# Patient Record
Sex: FEMALE | Race: OTHER | NOT HISPANIC OR LATINO | Employment: UNEMPLOYED | URBAN - METROPOLITAN AREA
[De-identification: names, ages, dates, MRNs, and addresses within clinical notes are randomized per-mention and may not be internally consistent; named-entity substitution may affect disease eponyms.]

---

## 2024-05-17 ENCOUNTER — TELEPHONE (OUTPATIENT)
Dept: OBSTETRICS AND GYNECOLOGY | Facility: CLINIC | Age: 24
End: 2024-05-17
Payer: MEDICAID

## 2024-05-17 NOTE — TELEPHONE ENCOUNTER
Spoke with patient for a total of 30 minutes.  Updated chart to reflect up to date patient demographics.  Medication, pharmacy, and family history updated.  Patient was guided through expectations of OB/GYN care throughout history of pregnancy.  Pregnancy confirmation, dating u/s initial OB  scheduled for the pregnancy.    Language Line :     ID : Susy   name : [id number]    Reached out to patient via language line , unable to reach patient , left voice mail via  .

## 2024-05-21 ENCOUNTER — CLINICAL SUPPORT (OUTPATIENT)
Dept: OBSTETRICS AND GYNECOLOGY | Facility: CLINIC | Age: 24
End: 2024-05-21
Payer: MEDICAID

## 2024-05-21 DIAGNOSIS — N91.2 AMENORRHEA: Primary | ICD-10-CM

## 2024-05-21 DIAGNOSIS — Z36.89 ENCOUNTER FOR FETAL ANATOMIC SURVEY: Primary | ICD-10-CM

## 2024-05-21 PROCEDURE — 99211 OFF/OP EST MAY X REQ PHY/QHP: CPT | Mod: PBBFAC

## 2024-05-21 PROCEDURE — 99999 PR PBB SHADOW E&M-EST. PATIENT-LVL I: CPT | Mod: PBBFAC,,,

## 2024-05-21 NOTE — PROGRESS NOTES
Narendra -     Name : Vee  Id# [id number]  Spoke with patient for a total of 30 minutes.  Updated chart to reflect up to date patient demographics.  Medication, pharmacy, and family history updated.  Patient was guided through expectations of OB/GYN care throughout history of pregnancy.  Pregnancy confirmation, dating u/s initial OB  scheduled for the pregnancy.

## 2024-05-23 ENCOUNTER — PROCEDURE VISIT (OUTPATIENT)
Dept: MATERNAL FETAL MEDICINE | Facility: CLINIC | Age: 24
End: 2024-05-23
Attending: OBSTETRICS & GYNECOLOGY
Payer: MEDICAID

## 2024-05-23 DIAGNOSIS — Z36.89 ENCOUNTER FOR FETAL ANATOMIC SURVEY: ICD-10-CM

## 2024-05-23 DIAGNOSIS — Z36.89 ENCOUNTER FOR ULTRASOUND TO ASSESS FETAL GROWTH: Primary | ICD-10-CM

## 2024-05-23 PROCEDURE — 76805 OB US >/= 14 WKS SNGL FETUS: CPT | Mod: PBBFAC | Performed by: OBSTETRICS & GYNECOLOGY

## 2024-05-24 PROBLEM — O09.32 LATE PRENATAL CARE AFFECTING PREGNANCY IN SECOND TRIMESTER: Status: ACTIVE | Noted: 2024-05-24

## 2024-05-28 ENCOUNTER — TELEPHONE (OUTPATIENT)
Dept: OBSTETRICS AND GYNECOLOGY | Facility: CLINIC | Age: 24
End: 2024-05-28
Payer: MEDICAID

## 2024-05-28 ENCOUNTER — LAB VISIT (OUTPATIENT)
Dept: LAB | Facility: OTHER | Age: 24
End: 2024-05-28
Attending: OBSTETRICS & GYNECOLOGY
Payer: MEDICAID

## 2024-05-28 ENCOUNTER — OFFICE VISIT (OUTPATIENT)
Dept: OBSTETRICS AND GYNECOLOGY | Facility: CLINIC | Age: 24
End: 2024-05-28
Attending: OBSTETRICS & GYNECOLOGY
Payer: MEDICAID

## 2024-05-28 VITALS
SYSTOLIC BLOOD PRESSURE: 90 MMHG | HEIGHT: 67 IN | DIASTOLIC BLOOD PRESSURE: 60 MMHG | WEIGHT: 149.94 LBS | BODY MASS INDEX: 23.53 KG/M2

## 2024-05-28 DIAGNOSIS — Z34.80 SUPERVISION OF OTHER NORMAL PREGNANCY, ANTEPARTUM: Primary | ICD-10-CM

## 2024-05-28 DIAGNOSIS — Z34.02 ENCOUNTER FOR SUPERVISION OF NORMAL FIRST PREGNANCY IN SECOND TRIMESTER: ICD-10-CM

## 2024-05-28 DIAGNOSIS — Z34.80 SUPERVISION OF OTHER NORMAL PREGNANCY, ANTEPARTUM: ICD-10-CM

## 2024-05-28 DIAGNOSIS — Z12.4 CERVICAL CANCER SCREENING: ICD-10-CM

## 2024-05-28 DIAGNOSIS — O09.32 LATE PRENATAL CARE AFFECTING PREGNANCY IN SECOND TRIMESTER: ICD-10-CM

## 2024-05-28 DIAGNOSIS — Z34.02 ENCOUNTER FOR SUPERVISION OF NORMAL FIRST PREGNANCY, SECOND TRIMESTER: Primary | ICD-10-CM

## 2024-05-28 LAB
ABO + RH BLD: NORMAL
ALBUMIN SERPL BCP-MCNC: 3.2 G/DL (ref 3.5–5.2)
ALP SERPL-CCNC: 81 U/L (ref 55–135)
ALT SERPL W/O P-5'-P-CCNC: 8 U/L (ref 10–44)
AST SERPL-CCNC: 14 U/L (ref 10–40)
BACTERIA #/AREA URNS AUTO: ABNORMAL /HPF
BILIRUB DIRECT SERPL-MCNC: 0.3 MG/DL (ref 0.1–0.3)
BILIRUB SERPL-MCNC: 0.7 MG/DL (ref 0.1–1)
BILIRUB UR QL STRIP: NEGATIVE
BLD GP AB SCN CELLS X3 SERPL QL: NORMAL
CLARITY UR REFRACT.AUTO: ABNORMAL
COLOR UR AUTO: YELLOW
GLUCOSE UR QL STRIP: NEGATIVE
HBV SURFACE AG SERPL QL IA: REACTIVE
HBV SURFACE AG SERPL QL NT: ABNORMAL
HCV AB SERPL QL IA: NEGATIVE
HGB UR QL STRIP: NEGATIVE
HIV 1+2 AB+HIV1 P24 AG SERPL QL IA: NEGATIVE
HYALINE CASTS UR QL AUTO: 0 /LPF
KETONES UR QL STRIP: NEGATIVE
LEUKOCYTE ESTERASE UR QL STRIP: ABNORMAL
MICROSCOPIC COMMENT: ABNORMAL
NITRITE UR QL STRIP: NEGATIVE
PH UR STRIP: 7 [PH] (ref 5–8)
PROT SERPL-MCNC: 7.3 G/DL (ref 6–8.4)
PROT UR QL STRIP: ABNORMAL
RBC #/AREA URNS AUTO: 2 /HPF (ref 0–4)
SP GR UR STRIP: 1.02 (ref 1–1.03)
SPECIMEN OUTDATE: NORMAL
SQUAMOUS #/AREA URNS AUTO: 5 /HPF
TREPONEMA PALLIDUM IGG+IGM AB [PRESENCE] IN SERUM OR PLASMA BY IMMUNOASSAY: NONREACTIVE
URN SPEC COLLECT METH UR: ABNORMAL
WBC #/AREA URNS AUTO: 15 /HPF (ref 0–5)

## 2024-05-28 PROCEDURE — 87491 CHLMYD TRACH DNA AMP PROBE: CPT | Performed by: OBSTETRICS & GYNECOLOGY

## 2024-05-28 PROCEDURE — 86850 RBC ANTIBODY SCREEN: CPT | Performed by: OBSTETRICS & GYNECOLOGY

## 2024-05-28 PROCEDURE — 99213 OFFICE O/P EST LOW 20 MIN: CPT | Mod: PBBFAC,25,TH | Performed by: OBSTETRICS & GYNECOLOGY

## 2024-05-28 PROCEDURE — 87389 HIV-1 AG W/HIV-1&-2 AB AG IA: CPT | Performed by: OBSTETRICS & GYNECOLOGY

## 2024-05-28 PROCEDURE — 86901 BLOOD TYPING SEROLOGIC RH(D): CPT | Performed by: OBSTETRICS & GYNECOLOGY

## 2024-05-28 PROCEDURE — 80076 HEPATIC FUNCTION PANEL: CPT | Performed by: OBSTETRICS & GYNECOLOGY

## 2024-05-28 PROCEDURE — 88175 CYTOPATH C/V AUTO FLUID REDO: CPT | Performed by: OBSTETRICS & GYNECOLOGY

## 2024-05-28 PROCEDURE — 86762 RUBELLA ANTIBODY: CPT | Performed by: OBSTETRICS & GYNECOLOGY

## 2024-05-28 PROCEDURE — 36415 COLL VENOUS BLD VENIPUNCTURE: CPT | Performed by: OBSTETRICS & GYNECOLOGY

## 2024-05-28 PROCEDURE — 86803 HEPATITIS C AB TEST: CPT | Performed by: OBSTETRICS & GYNECOLOGY

## 2024-05-28 PROCEDURE — 87591 N.GONORRHOEAE DNA AMP PROB: CPT | Performed by: OBSTETRICS & GYNECOLOGY

## 2024-05-28 PROCEDURE — 99999 PR PBB SHADOW E&M-EST. PATIENT-LVL III: CPT | Mod: PBBFAC,,, | Performed by: OBSTETRICS & GYNECOLOGY

## 2024-05-28 PROCEDURE — 99203 OFFICE O/P NEW LOW 30 MIN: CPT | Mod: TH,S$PBB,, | Performed by: OBSTETRICS & GYNECOLOGY

## 2024-05-28 PROCEDURE — 81001 URINALYSIS AUTO W/SCOPE: CPT | Performed by: OBSTETRICS & GYNECOLOGY

## 2024-05-28 PROCEDURE — 87086 URINE CULTURE/COLONY COUNT: CPT | Performed by: OBSTETRICS & GYNECOLOGY

## 2024-05-28 PROCEDURE — 86593 SYPHILIS TEST NON-TREP QUANT: CPT | Performed by: OBSTETRICS & GYNECOLOGY

## 2024-05-28 PROCEDURE — 87341 HEP B SURFACE AG NEUTRLZJ IA: CPT | Performed by: OBSTETRICS & GYNECOLOGY

## 2024-05-28 PROCEDURE — 87340 HEPATITIS B SURFACE AG IA: CPT | Performed by: OBSTETRICS & GYNECOLOGY

## 2024-05-28 NOTE — PROGRESS NOTES
SUBJECTIVE:   24 y.o. female   for new pregnancy visit. No LMP recorded (lmp unknown). Patient is pregnant..  She normally has regular periods.  Not using contraception.  She has no unusual complaints. Present with partner and ROBERT .         Past Medical History:   Diagnosis Date    Unspecified viral hepatitis B without hepatic coma      History reviewed. No pertinent surgical history.  Social History     Socioeconomic History    Marital status:    Tobacco Use    Smoking status: Never     Passive exposure: Never    Smokeless tobacco: Never   Substance and Sexual Activity    Alcohol use: Not Currently    Drug use: Not Currently    Sexual activity: Not Currently     No family history on file.  OB History    Para Term  AB Living   3 2 1     2   SAB IAB Ectopic Multiple Live Births           2      # Outcome Date GA Lbr Marshall/2nd Weight Sex Type Anes PTL Lv   3 Current            2 Term 21 40w0d   M Vag-Spont OTHER  RONY   1 Para 19 40w0d   F Vag-Spont  N RONY         Current Outpatient Medications   Medication Sig Dispense Refill    acetaminophen (TYLENOL) 500 MG tablet Take 1 tablet (500 mg total) by mouth every 6 (six) hours as needed. 30 tablet 0     No current facility-administered medications for this visit.     Allergies: Patient has no known allergies.     ROS:  Constitutional: no weight loss, weight gain, fever, fatigue  Eyes:  No vision changes, glasses/contacts  ENT/Mouth: No ulcers, sinus problems, ears ringing, headache  Cardiovascular: No inability to lie flat, chest pain, exercise intolerance, swelling, heart palpitations  Respiratory: No wheezing, coughing blood, shortness of breath, or cough  Gastrointestinal: No diarrhea, bloody stool, nausea/vomiting, constipation, gas, hemorrhoids  Genitourinary: No blood in urine, painful urination, urgency of urination, frequency of urination, incomplete emptying, incontinence, painful periods, heavy periods, vaginal  "discharge, vaginal odor, painful intercourse, sexual problems, bleeding after intercourse.  Musculoskeletal: No muscle weakness  Skin/Breast: No painful breasts, nipple discharge, masses, rash, ulcers  Neurological: No passing out, seizures, numbness, headache  Endocrine: No diabetes, hypothyroid, hyperthyroid, hot flashes, hair loss, abnormal hair growth, ance  Psychiatric: No depression, crying  Hematologic: No bruises, bleeding, swollen lymph nodes, anemia.      OBJECTIVE:   The patient appears well, alert, oriented x 3, in no distress.  BP 90/60 (BP Location: Right arm, Patient Position: Sitting, BP Method: Medium (Manual))   Ht 5' 7" (1.702 m)   Wt 68 kg (149 lb 14.6 oz)   LMP  (LMP Unknown)   BMI 23.48 kg/m²   NECK: no thyromegaly, trachea midline  SKIN: no acne, striae, hirsutism  CHEST: CTAB  CV: RRR  BREAST EXAM: breasts appear normal, no suspicious masses, no skin or nipple changes or axillary nodes  ABDOMEN: Gravid. FHT+  GENITALIA: normal external genitalia, no erythema, no discharge  URETHRA: normal urethra, normal urethral meatus  VAGINA: Normal  CERVIX: no lesions or cervical motion tenderness  UTERUS: Gravid. FCA+  ADNEXA: no mass, fullness, tenderness      ASSESSMENT:   1. Supervision of other normal pregnancy, antepartum  HEPATIC FUNCTION PANEL      2. Late prenatal care affecting pregnancy in second trimester        3. Cervical cancer screening  CANCELED: Liquid-Based Pap Smear, Screening      4. Encounter for supervision of normal first pregnancy in second trimester  Urinalysis    Urine culture    Rubella Antibody, IgG    HIV 1/2 Ag/Ab (4th Gen)    Hepatitis B Surface Antigen    Treponema Pallidium Antibodies IgG, IgM    Hepatitis C Antibody    C. trachomatis/N. gonorrhoeae by AMP DNA    CANCELED: Type & Screen - Ob Profile    CANCELED: OB Glucose Screen          PLAN:   Orders Placed This Encounter    Urine culture    Urinalysis    Rubella Antibody, IgG    HIV 1/2 Ag/Ab (4th Gen)    " Hepatitis B Surface Antigen    Treponema Pallidium Antibodies IgG, IgM    Hepatitis C Antibody    C. trachomatis/N. gonorrhoeae by AMP DNA    HEPATIC FUNCTION PANEL    Urinalysis Microscopic    Pap Smear, Thin Prep with Reflex to HPV     Discussed new OB, ER precautions, PNV, diet, OB labs, u/s  RTC 2-3 weeks

## 2024-05-29 ENCOUNTER — PATIENT MESSAGE (OUTPATIENT)
Dept: OBSTETRICS AND GYNECOLOGY | Facility: CLINIC | Age: 24
End: 2024-05-29
Payer: MEDICAID

## 2024-05-29 LAB
BACTERIA UR CULT: NORMAL
BACTERIA UR CULT: NORMAL
C TRACH DNA SPEC QL NAA+PROBE: NOT DETECTED
N GONORRHOEA DNA SPEC QL NAA+PROBE: NOT DETECTED
RUBV IGG SER-ACNC: 185 IU/ML
RUBV IGG SER-IMP: REACTIVE

## 2024-06-02 PROBLEM — B18.1 HEPATITIS B CARRIER: Status: ACTIVE | Noted: 2024-06-02

## 2024-06-03 ENCOUNTER — TELEPHONE (OUTPATIENT)
Dept: OBSTETRICS AND GYNECOLOGY | Facility: CLINIC | Age: 24
End: 2024-06-03
Payer: MEDICAID

## 2024-06-03 ENCOUNTER — PATIENT MESSAGE (OUTPATIENT)
Dept: MATERNAL FETAL MEDICINE | Facility: CLINIC | Age: 24
End: 2024-06-03
Payer: MEDICAID

## 2024-06-03 DIAGNOSIS — B18.1 HEPATITIS B CARRIER: Primary | ICD-10-CM

## 2024-06-10 ENCOUNTER — OFFICE VISIT (OUTPATIENT)
Dept: MATERNAL FETAL MEDICINE | Facility: CLINIC | Age: 24
End: 2024-06-10
Payer: MEDICAID

## 2024-06-10 ENCOUNTER — LAB VISIT (OUTPATIENT)
Dept: LAB | Facility: OTHER | Age: 24
End: 2024-06-10
Attending: OBSTETRICS & GYNECOLOGY
Payer: MEDICAID

## 2024-06-10 ENCOUNTER — PROCEDURE VISIT (OUTPATIENT)
Dept: MATERNAL FETAL MEDICINE | Facility: CLINIC | Age: 24
End: 2024-06-10
Payer: MEDICAID

## 2024-06-10 DIAGNOSIS — O09.32 LATE PRENATAL CARE AFFECTING PREGNANCY IN SECOND TRIMESTER: ICD-10-CM

## 2024-06-10 DIAGNOSIS — Z36.89 ENCOUNTER FOR ULTRASOUND TO ASSESS FETAL GROWTH: ICD-10-CM

## 2024-06-10 DIAGNOSIS — B18.1 HEPATITIS B CARRIER: Primary | ICD-10-CM

## 2024-06-10 DIAGNOSIS — B18.1 HEPATITIS B CARRIER: ICD-10-CM

## 2024-06-10 LAB
HBV CORE IGM SERPL QL IA: NORMAL
HBV SURFACE AB SER-ACNC: <3 MIU/ML
HBV SURFACE AB SER-ACNC: NORMAL M[IU]/ML
INR PPP: 1 (ref 0.8–1.2)
PROTHROMBIN TIME: 10.9 SEC (ref 9–12.5)

## 2024-06-10 PROCEDURE — 86705 HEP B CORE ANTIBODY IGM: CPT | Performed by: OBSTETRICS & GYNECOLOGY

## 2024-06-10 PROCEDURE — 86706 HEP B SURFACE ANTIBODY: CPT | Mod: 91 | Performed by: OBSTETRICS & GYNECOLOGY

## 2024-06-10 PROCEDURE — 87341 HEP B SURFACE AG NEUTRLZJ IA: CPT | Performed by: OBSTETRICS & GYNECOLOGY

## 2024-06-10 PROCEDURE — 87350 HEPATITIS BE AG IA: CPT | Performed by: OBSTETRICS & GYNECOLOGY

## 2024-06-10 PROCEDURE — 87340 HEPATITIS B SURFACE AG IA: CPT | Performed by: OBSTETRICS & GYNECOLOGY

## 2024-06-10 PROCEDURE — 86704 HEP B CORE ANTIBODY TOTAL: CPT | Performed by: OBSTETRICS & GYNECOLOGY

## 2024-06-10 PROCEDURE — 87517 HEPATITIS B DNA QUANT: CPT | Performed by: OBSTETRICS & GYNECOLOGY

## 2024-06-10 PROCEDURE — 99214 OFFICE O/P EST MOD 30 MIN: CPT | Mod: S$PBB,TH,, | Performed by: OBSTETRICS & GYNECOLOGY

## 2024-06-10 PROCEDURE — 76816 OB US FOLLOW-UP PER FETUS: CPT | Mod: PBBFAC | Performed by: OBSTETRICS & GYNECOLOGY

## 2024-06-10 PROCEDURE — 86706 HEP B SURFACE ANTIBODY: CPT | Performed by: OBSTETRICS & GYNECOLOGY

## 2024-06-10 PROCEDURE — 85610 PROTHROMBIN TIME: CPT | Performed by: OBSTETRICS & GYNECOLOGY

## 2024-06-10 NOTE — PROGRESS NOTES
MATERNAL-FETAL MEDICINE   CONSULT NOTE    Provider requesting consultation: Dr. Rangel    SUBJECTIVE:     Ms. Sheron Frankel is a 24 y.o.  female with IUP at Unknown who is seen in consultation by MFM for evaluation and management of:  Problem   Hepatitis B Carrier   Late Prenatal Care Affecting Pregnancy in Second Trimester            Medication List with Changes/Refills   Current Medications    ACETAMINOPHEN (TYLENOL) 500 MG TABLET    Take 1 tablet (500 mg total) by mouth every 6 (six) hours as needed.       Review of patient's allergies indicates:  No Known Allergies    PMH:  Past Medical History:   Diagnosis Date    Unspecified viral hepatitis B without hepatic coma        PObHx:  OB History    Para Term  AB Living   3 2 1     2   SAB IAB Ectopic Multiple Live Births           2      # Outcome Date GA Lbr Marshall/2nd Weight Sex Type Anes PTL Lv   3 Current            2 Term 21 40w0d   M Vag-Spont OTHER  RONY   1 Para 19 40w0d   F Vag-Spont  N RONY       PSH:No past surgical history on file.    Family history:family history is not on file.    Social history: reports that she has never smoked. She has never been exposed to tobacco smoke. She has never used smokeless tobacco. She reports that she does not currently use alcohol. She reports that she does not currently use drugs.    Genetic history: The patient denies any inherited genetic diseases or birth defects in herself or her partner's personal history or family.    Objective:   LMP  (LMP Unknown)     Physical Exam    Ultrasound performed. See viewpoint for full ultrasound report.    Significant labs/imaging:      ASSESSMENT/PLAN:     24 y.o.  female with IUP at 31+0    Hepatitis B carrier  Labs sent today:  Hep B VL  Hep B C AB + IgM  Hep B e Ag  Hep Bs AB  INR    Full consult after review of above labs.    Late prenatal care affecting pregnancy in second trimester  Patient returns for f/u ultrasound. Dating at last US  5/23 was consistent with ALDEN of 8/12. Today's biometry is consistent with prior dating.    Ultrasound today remains incomplete for conclusive determination of fetal sex. Fetal genitalia in most views appear consistent with female sex; however, some views demonstrate prominence of the labia minora. Prior imaging from 5/23 appeared consistent with female genitalia.    We discussed some preliminary testing options for determination of fetal sex: cfDNA v. Amniocentesis. At this time, they have elected to proceed with cfDNA testing and f/u imaging in two weeks with MFM consult and possibly genetic counseling visit at that time.     They deny any history of exogenous hormone administration, maternal ovaries appear normal, no family history of genetic or congenital anomalies. Has h/o 2 prior pregnancies-normal SVDs.    Additional counseling will be performed if indicated based on results of cfDNA testing and follow-up imaging.    Certified  utilized for duration of consult.      35 minutes of total time spent on the encounter, which includes face to face time and non-face to face time preparing to see the patient (eg, review of tests), obtaining and/or reviewing separately obtained history, documenting clinical information in the electronic or other health record, independently interpreting results (not separately reported) and communicating results to the patient/family/caregiver, or care coordination (not separately reported).      Елена Pena  Maternal-Fetal Medicine    Electronically Signed by Елена Pena Kim 10, 2024

## 2024-06-10 NOTE — ASSESSMENT & PLAN NOTE
Labs sent today:  Hep B VL  Hep B C AB + IgM  Hep B e Ag  Hep Bs AB  INR    Full consult after review of above labs.

## 2024-06-10 NOTE — ASSESSMENT & PLAN NOTE
Patient returns for f/u ultrasound. Dating at last US 5/23 was consistent with ALDEN of 8/12. Today's biometry is consistent with prior dating.    Ultrasound today remains incomplete for conclusive determination of fetal sex. Fetal genitalia in most views appear consistent with female sex; however, some views demonstrate prominence of the labia minora. Prior imaging from 5/23 appeared consistent with female genitalia.    We discussed some preliminary testing options for determination of fetal sex: cfDNA v. Amniocentesis. At this time, they have elected to proceed with cfDNA testing and f/u imaging in two weeks with MFM consult and possibly genetic counseling visit at that time.     They deny any history of exogenous hormone administration, maternal ovaries appear normal, no family history of genetic or congenital anomalies. Has h/o 2 prior pregnancies-normal SVDs.    Additional counseling will be performed if indicated based on results of cfDNA testing and follow-up imaging.

## 2024-06-11 LAB
HBV CORE AB SERPL QL IA: REACTIVE
HBV SURFACE AG SERPL QL IA: REACTIVE
HBV SURFACE AG SERPL QL NT: ABNORMAL
HEPATITIS B VIRUS DNA: ABNORMAL
HEPATITIS B VIRUS LOG (IU/ML): 2.82 LOGIU/ML
HEPATITIS B VIRUS PCR, QUANT: 665 IU/ML

## 2024-06-13 LAB
HBV E AG SERPL QL IA: NONREACTIVE
HBV SURFACE AB SER QL IA: NEGATIVE
HBV SURFACE AB SERPL IA-ACNC: 3 MIU/ML

## 2024-06-17 ENCOUNTER — TELEPHONE (OUTPATIENT)
Dept: MATERNAL FETAL MEDICINE | Facility: CLINIC | Age: 24
End: 2024-06-17
Payer: MEDICAID

## 2024-06-18 ENCOUNTER — ROUTINE PRENATAL (OUTPATIENT)
Dept: OBSTETRICS AND GYNECOLOGY | Facility: CLINIC | Age: 24
End: 2024-06-18
Attending: OBSTETRICS & GYNECOLOGY

## 2024-06-18 VITALS — SYSTOLIC BLOOD PRESSURE: 92 MMHG | WEIGHT: 155 LBS | DIASTOLIC BLOOD PRESSURE: 54 MMHG | BODY MASS INDEX: 24.27 KG/M2

## 2024-06-18 DIAGNOSIS — O09.32 LATE PRENATAL CARE AFFECTING PREGNANCY IN SECOND TRIMESTER: ICD-10-CM

## 2024-06-18 DIAGNOSIS — B18.1 HEPATITIS B CARRIER: ICD-10-CM

## 2024-06-18 DIAGNOSIS — Z34.80 SUPERVISION OF OTHER NORMAL PREGNANCY, ANTEPARTUM: Primary | ICD-10-CM

## 2024-06-18 PROCEDURE — 0502F SUBSEQUENT PRENATAL CARE: CPT | Mod: ,,, | Performed by: OBSTETRICS & GYNECOLOGY

## 2024-06-18 PROCEDURE — 99213 OFFICE O/P EST LOW 20 MIN: CPT | Mod: PBBFAC | Performed by: OBSTETRICS & GYNECOLOGY

## 2024-06-18 PROCEDURE — 99999 PR PBB SHADOW E&M-EST. PATIENT-LVL III: CPT | Mod: PBBFAC,,, | Performed by: OBSTETRICS & GYNECOLOGY

## 2024-06-25 ENCOUNTER — SOCIAL WORK (OUTPATIENT)
Dept: CASE MANAGEMENT | Facility: OTHER | Age: 24
End: 2024-06-25
Payer: MEDICAID

## 2024-06-25 ENCOUNTER — PROCEDURE VISIT (OUTPATIENT)
Dept: MATERNAL FETAL MEDICINE | Facility: CLINIC | Age: 24
End: 2024-06-25
Payer: MEDICAID

## 2024-06-25 ENCOUNTER — OFFICE VISIT (OUTPATIENT)
Dept: MATERNAL FETAL MEDICINE | Facility: CLINIC | Age: 24
End: 2024-06-25

## 2024-06-25 VITALS
BODY MASS INDEX: 24.56 KG/M2 | SYSTOLIC BLOOD PRESSURE: 102 MMHG | HEIGHT: 67 IN | WEIGHT: 156.5 LBS | DIASTOLIC BLOOD PRESSURE: 62 MMHG

## 2024-06-25 DIAGNOSIS — B18.1 HEPATITIS B CARRIER: Primary | ICD-10-CM

## 2024-06-25 DIAGNOSIS — Z36.89 ENCOUNTER FOR ULTRASOUND TO ASSESS FETAL GROWTH: Primary | ICD-10-CM

## 2024-06-25 DIAGNOSIS — B18.1 HEPATITIS B CARRIER: ICD-10-CM

## 2024-06-25 DIAGNOSIS — O09.32 LATE PRENATAL CARE AFFECTING PREGNANCY IN SECOND TRIMESTER: ICD-10-CM

## 2024-06-25 DIAGNOSIS — Z36.89 ENCOUNTER FOR ULTRASOUND TO ASSESS FETAL GROWTH: ICD-10-CM

## 2024-06-25 PROCEDURE — 99213 OFFICE O/P EST LOW 20 MIN: CPT | Mod: PBBFAC,25 | Performed by: OBSTETRICS & GYNECOLOGY

## 2024-06-25 PROCEDURE — 76815 OB US LIMITED FETUS(S): CPT | Mod: PBBFAC | Performed by: OBSTETRICS & GYNECOLOGY

## 2024-06-25 PROCEDURE — 99999 PR PBB SHADOW E&M-EST. PATIENT-LVL III: CPT | Mod: PBBFAC,,, | Performed by: OBSTETRICS & GYNECOLOGY

## 2024-06-25 NOTE — PROGRESS NOTES
Bj contacted by Dr. Bullock in [facility] clinic requesting for sw to see pt to assist with resources. Per Dr. Bullock, pt has food insecurity and transportation issues. Sw met with pt in clinic. Sw confirmed address, phone number, and email address. Pt shared that she has limited food and limited transportation for appointments. Sw provided pt with [facility] information; local food anton and specifically pointed out the [employer]. Sw informed her that [facility] has an emergency pantry opened on today from 9am-4pm. Sw also encouraged her to call [facility] today to request an appointment for herself and 4yo child. Other resources given were [facility] for supplies for infants and mothers as well as Medicaid transportation contact numbers. Sw inquired about medicaid benefits. Pt shared that she applied for medicaid approximately one month ago or less. Sw did advise pt that medicaid can take up to 2 months to five approval. Pt voiced understanding. Pt did not have any additional needs or concerns. Bj encouraged pt to continue to come to all appointments as medicaid typically retroactively covers three months of medical bills. Pt voiced understanding.     At this time, there are no other social work needs. Please consult social work if other resources are needed.      services provided by [facility] #[id number]

## 2024-06-25 NOTE — PROGRESS NOTES
"Maternal Fetal Medicine Follow Up Consult    SUBJECTIVE:   Sheron Frankel is a 24 y.o.  female with IUP at 33w1d who is seen in follow up consultation by MFM.  Pregnancy complications include:   Problem   Hepatitis B Carrier   Late Prenatal Care Affecting Pregnancy in Second Trimester     Interval history since last MFM visit: none. Doing well. Normal movements.     Previous notes reviewed. No changes to medical, surgical, family, social, or obstetric history.  Medications:  Current Outpatient Medications   Medication Instructions    acetaminophen (TYLENOL) 500 mg, Oral, Every 6 hours PRN     Care team members:  Juan Carlos - Primary OB    OBJECTIVE:   /62 (BP Location: Left arm, Patient Position: Sitting, BP Method: Medium (Automatic))   Ht 5' 7" (1.702 m)   Wt 71 kg (156 lb 8.4 oz)   LMP 12/15/2023   BMI 24.52 kg/m²     Ultrasound performed. See viewpoint for full ultrasound report.    Significant labs/imaging:  cfDNA low risk XX  ASSESSMENT/PLAN:   24 y.o.  female with IUP at 33w1d    Hepatitis B carrier  Hepatitis B  Labs results show chronic carrier state of HBV infection. She said she had some treatment in [place], however this consisted of only a few oral tablets. I counseled the patient regarding maternal-fetal risks of hepatitis B in pregnancy. Maternal risks of chronic infection include development of active hepatitis, cirrhosis, hepatocellular carcinoma, and maternal to child transmission. 10-90% of gravidas actively infected with hepatitis B will vertically transmit the infection to their offspring (10-20% of mothers seropositive for HBsAg alone and 90% of women seropositive for both HBsAg and HBeAg). Vertically acquired hepatitis B can result in a chronic carrier state in up to 90% of infected infants (compared to 10-15% in adults) with progression to cirrhosis and/or hepatic carcinoma. She does not meet criteria based off of viral load for Tenofovir treatment.    "   Recommendations:  Avoid hepatotoxic medications; Tylenol use should be limited  Refer to hepatology (this is to be ordered by primary OB provider)  Vaccinate for hepatitis A if patient is non-immune  Repeat STI testing in third trimester  Encourage vaccination of household members if non-immuned  Fetal growth ultrasounds every 4-6 weeks in the third trimester due to risk of FGR - scheduled  Evaluate for potential antiviral treatment in the third trimester:  Repeat LFTs, check serum creatinine and HBV viral load at 26-28 weeks  For women with a viral load > 6-8 log 10 copies/mL, initiate tenofovir 300 mg daily from 28 weeks until delivery.   Consult hepatology prior to starting if patient has lactic acidosis, HIV, severe hepatomegaly with steatosis, or renal failure  Repeat creatinine, AST/ALT in 4-6 weeks after tenofovir initiation.  Discontinue Tenofovir at delivery.  Repeat AST/ALT at 6 week post-partum visit or earlier if symptomatic    Intrapartum management  Hepatitis B infection is not an indication for elective   Avoid (if possible) or minimize use of early amniotomy, fetal scalp electrode, or operative vaginal delivery    Postpartum  Infants should receive Hepatitis B Immune Globulin (HBIG) in addition to the standard Hepatitis B vaccine series within 12 hours of birth.  Breastfeeding is not contraindicated as long as immunoprophylaxis is given to the  at birth     Invasive Testing: There is little data regarding the risk of fetal transmission with invasive procedures such as amniocentesis or chorionic villus sampling and this should be considered on a case by case basis. As a general rule, invasive testing (CVS or amniocentesis) may be offered with counseling that the risk for maternal-fetal transmission increases with HBV viral load > 7 log 10 IU/mL      Late prenatal care affecting pregnancy in second trimester  Patient returns for f/u ultrasound. Prior ultrasound was concerning for  possibility of ambiguous genitalia, though suspicion was female. Today, the ultrasound was reassuring for female sex showing more normal appearing labia minora/majora. cfDNA testing resulted as normal XX. We discussed that these concordant results is reassurance that the previous ultrasound findings were a variant of normal.    Recommendations:  - Follow up growth in 3-4 weeks      HUMA Bullock MD  Maternal Fetal Medicine Fellow   PGY-5

## 2024-06-25 NOTE — ASSESSMENT & PLAN NOTE
Patient returns for f/u ultrasound. Prior ultrasound was concerning for possibility of ambiguous genitalia, though suspicion was female. Today, the ultrasound was reassuring for female sex showing more normal appearing labia minora/majora. cfDNA testing resulted as normal XX. We discussed that these concordant results is reassurance that the previous ultrasound findings were a variant of normal.    Recommendations:  - Follow up growth in 3-4 weeks

## 2024-06-25 NOTE — ASSESSMENT & PLAN NOTE
Hepatitis B  Labs results show chronic carrier state of HBV infection. She said she had some treatment in [place], however this consisted of only a few oral tablets. I counseled the patient regarding maternal-fetal risks of hepatitis B in pregnancy. Maternal risks of chronic infection include development of active hepatitis, cirrhosis, hepatocellular carcinoma, and maternal to child transmission. 10-90% of gravidas actively infected with hepatitis B will vertically transmit the infection to their offspring (10-20% of mothers seropositive for HBsAg alone and 90% of women seropositive for both HBsAg and HBeAg). Vertically acquired hepatitis B can result in a chronic carrier state in up to 90% of infected infants (compared to 10-15% in adults) with progression to cirrhosis and/or hepatic carcinoma. She does not meet criteria based off of viral load for Tenofovir treatment.      Recommendations:  Avoid hepatotoxic medications; Tylenol use should be limited  Refer to hepatology (this is to be ordered by primary OB provider)  Vaccinate for hepatitis A if patient is non-immune  Repeat STI testing in third trimester  Encourage vaccination of household members if non-immuned  Fetal growth ultrasounds every 4-6 weeks in the third trimester due to risk of FGR - scheduled  Evaluate for potential antiviral treatment in the third trimester:  Repeat LFTs, check serum creatinine and HBV viral load at 26-28 weeks  For women with a viral load > 6-8 log 10 copies/mL, initiate tenofovir 300 mg daily from 28 weeks until delivery.   Consult hepatology prior to starting if patient has lactic acidosis, HIV, severe hepatomegaly with steatosis, or renal failure  Repeat creatinine, AST/ALT in 4-6 weeks after tenofovir initiation.  Discontinue Tenofovir at delivery.  Repeat AST/ALT at 6 week post-partum visit or earlier if symptomatic    Intrapartum management  Hepatitis B infection is not an indication for elective   Avoid (if  possible) or minimize use of early amniotomy, fetal scalp electrode, or operative vaginal delivery    Postpartum  Infants should receive Hepatitis B Immune Globulin (HBIG) in addition to the standard Hepatitis B vaccine series within 12 hours of birth.  Breastfeeding is not contraindicated as long as immunoprophylaxis is given to the  at birth     Invasive Testing: There is little data regarding the risk of fetal transmission with invasive procedures such as amniocentesis or chorionic villus sampling and this should be considered on a case by case basis. As a general rule, invasive testing (CVS or amniocentesis) may be offered with counseling that the risk for maternal-fetal transmission increases with HBV viral load > 7 log 10 IU/mL

## 2024-06-26 ENCOUNTER — TELEPHONE (OUTPATIENT)
Dept: OBSTETRICS AND GYNECOLOGY | Facility: CLINIC | Age: 24
End: 2024-06-26
Payer: MEDICAID

## 2024-06-26 DIAGNOSIS — Z34.80 SUPERVISION OF OTHER NORMAL PREGNANCY, ANTEPARTUM: ICD-10-CM

## 2024-06-26 DIAGNOSIS — B18.1 HEPATITIS B CARRIER: Primary | ICD-10-CM

## 2024-07-02 ENCOUNTER — ROUTINE PRENATAL (OUTPATIENT)
Dept: OBSTETRICS AND GYNECOLOGY | Facility: CLINIC | Age: 24
End: 2024-07-02
Attending: OBSTETRICS & GYNECOLOGY

## 2024-07-02 VITALS — SYSTOLIC BLOOD PRESSURE: 90 MMHG | BODY MASS INDEX: 24.41 KG/M2 | WEIGHT: 155.88 LBS | DIASTOLIC BLOOD PRESSURE: 60 MMHG

## 2024-07-02 DIAGNOSIS — O09.32 LATE PRENATAL CARE AFFECTING PREGNANCY IN SECOND TRIMESTER: ICD-10-CM

## 2024-07-02 DIAGNOSIS — Z34.80 SUPERVISION OF OTHER NORMAL PREGNANCY, ANTEPARTUM: Primary | ICD-10-CM

## 2024-07-02 DIAGNOSIS — Z3A.34 34 WEEKS GESTATION OF PREGNANCY: ICD-10-CM

## 2024-07-02 PROCEDURE — 99999 PR PBB SHADOW E&M-EST. PATIENT-LVL III: CPT | Mod: PBBFAC,,, | Performed by: FAMILY MEDICINE

## 2024-07-02 PROCEDURE — 99213 OFFICE O/P EST LOW 20 MIN: CPT | Mod: PBBFAC | Performed by: FAMILY MEDICINE

## 2024-07-02 NOTE — PATIENT INSTRUCTIONS
[facility] DEL[facility] PHONE NUMBER, [phone] (OPEN 24/7, LOCATED ON 6TH FLOOR OF HOSPITAL)  [address] PHONE NUMBER, [phone] (OPEN MON-FRI, 8a-5p)

## 2024-07-02 NOTE — PROGRESS NOTES
Here for routine OB appt at 34w1d, with no complaints.  Reports good FM.  Denies LOF, denies VB, denies contractions. No HAs.  Reviewed warning signs of Labor and Preeclampsia. Has not done glucose yet, will do next visit with 3t labs. Will do tdap next visit. A POS. Daily FM counts reinforced.  F/U scheduled 1 week   used for entirety of visit

## 2024-07-08 ENCOUNTER — PATIENT MESSAGE (OUTPATIENT)
Dept: OTHER | Facility: OTHER | Age: 24
End: 2024-07-08
Payer: MEDICAID

## 2024-07-09 ENCOUNTER — ROUTINE PRENATAL (OUTPATIENT)
Dept: OBSTETRICS AND GYNECOLOGY | Facility: CLINIC | Age: 24
End: 2024-07-09
Attending: OBSTETRICS & GYNECOLOGY
Payer: MEDICAID

## 2024-07-09 ENCOUNTER — SOCIAL WORK (OUTPATIENT)
Dept: CASE MANAGEMENT | Facility: OTHER | Age: 24
End: 2024-07-09
Payer: MEDICAID

## 2024-07-09 ENCOUNTER — LAB VISIT (OUTPATIENT)
Dept: LAB | Facility: OTHER | Age: 24
End: 2024-07-09
Attending: OBSTETRICS & GYNECOLOGY
Payer: MEDICAID

## 2024-07-09 VITALS — DIASTOLIC BLOOD PRESSURE: 58 MMHG | SYSTOLIC BLOOD PRESSURE: 90 MMHG | WEIGHT: 155.88 LBS | BODY MASS INDEX: 24.41 KG/M2

## 2024-07-09 DIAGNOSIS — Z34.80 SUPERVISION OF OTHER NORMAL PREGNANCY, ANTEPARTUM: Primary | ICD-10-CM

## 2024-07-09 DIAGNOSIS — Z34.02 ENCOUNTER FOR SUPERVISION OF NORMAL FIRST PREGNANCY, SECOND TRIMESTER: ICD-10-CM

## 2024-07-09 DIAGNOSIS — O09.32 LATE PRENATAL CARE AFFECTING PREGNANCY IN SECOND TRIMESTER: ICD-10-CM

## 2024-07-09 DIAGNOSIS — B18.1 HEPATITIS B CARRIER: ICD-10-CM

## 2024-07-09 DIAGNOSIS — Z34.80 SUPERVISION OF OTHER NORMAL PREGNANCY, ANTEPARTUM: ICD-10-CM

## 2024-07-09 LAB
BASOPHILS # BLD AUTO: 0.03 K/UL (ref 0–0.2)
BASOPHILS NFR BLD: 0.5 % (ref 0–1.9)
DIFFERENTIAL METHOD BLD: ABNORMAL
EOSINOPHIL # BLD AUTO: 0.1 K/UL (ref 0–0.5)
EOSINOPHIL NFR BLD: 1.1 % (ref 0–8)
ERYTHROCYTE [DISTWIDTH] IN BLOOD BY AUTOMATED COUNT: 12.1 % (ref 11.5–14.5)
GLUCOSE SERPL-MCNC: 93 MG/DL (ref 70–140)
HCT VFR BLD AUTO: 31.1 % (ref 37–48.5)
HGB BLD-MCNC: 10.2 G/DL (ref 12–16)
HIV 1+2 AB+HIV1 P24 AG SERPL QL IA: NEGATIVE
IMM GRANULOCYTES # BLD AUTO: 0.07 K/UL (ref 0–0.04)
IMM GRANULOCYTES NFR BLD AUTO: 1.1 % (ref 0–0.5)
LYMPHOCYTES # BLD AUTO: 1.4 K/UL (ref 1–4.8)
LYMPHOCYTES NFR BLD: 23 % (ref 18–48)
MCH RBC QN AUTO: 29.9 PG (ref 27–31)
MCHC RBC AUTO-ENTMCNC: 32.8 G/DL (ref 32–36)
MCV RBC AUTO: 91 FL (ref 82–98)
MONOCYTES # BLD AUTO: 0.4 K/UL (ref 0.3–1)
MONOCYTES NFR BLD: 5.9 % (ref 4–15)
NEUTROPHILS # BLD AUTO: 4.3 K/UL (ref 1.8–7.7)
NEUTROPHILS NFR BLD: 68.4 % (ref 38–73)
NRBC BLD-RTO: 0 /100 WBC
PLATELET # BLD AUTO: 230 K/UL (ref 150–450)
PMV BLD AUTO: 9.8 FL (ref 9.2–12.9)
RBC # BLD AUTO: 3.41 M/UL (ref 4–5.4)
TREPONEMA PALLIDUM IGG+IGM AB [PRESENCE] IN SERUM OR PLASMA BY IMMUNOASSAY: NONREACTIVE
WBC # BLD AUTO: 6.23 K/UL (ref 3.9–12.7)

## 2024-07-09 PROCEDURE — 99999 PR PBB SHADOW E&M-EST. PATIENT-LVL III: CPT | Mod: PBBFAC,,, | Performed by: OBSTETRICS & GYNECOLOGY

## 2024-07-09 PROCEDURE — 99213 OFFICE O/P EST LOW 20 MIN: CPT | Mod: PBBFAC,TH | Performed by: OBSTETRICS & GYNECOLOGY

## 2024-07-09 PROCEDURE — 87389 HIV-1 AG W/HIV-1&-2 AB AG IA: CPT | Performed by: FAMILY MEDICINE

## 2024-07-09 PROCEDURE — 36415 COLL VENOUS BLD VENIPUNCTURE: CPT | Performed by: FAMILY MEDICINE

## 2024-07-09 PROCEDURE — 85025 COMPLETE CBC W/AUTO DIFF WBC: CPT | Performed by: FAMILY MEDICINE

## 2024-07-09 PROCEDURE — 86593 SYPHILIS TEST NON-TREP QUANT: CPT | Performed by: FAMILY MEDICINE

## 2024-07-09 PROCEDURE — 87081 CULTURE SCREEN ONLY: CPT | Performed by: OBSTETRICS & GYNECOLOGY

## 2024-07-09 PROCEDURE — 82950 GLUCOSE TEST: CPT | Performed by: OBSTETRICS & GYNECOLOGY

## 2024-07-09 PROCEDURE — 99213 OFFICE O/P EST LOW 20 MIN: CPT | Mod: TH,S$PBB,, | Performed by: OBSTETRICS & GYNECOLOGY

## 2024-07-09 NOTE — PROGRESS NOTES
SW contacted by OB clinic to meet with pt due to food insecurities. SW met with pt while in clinic. Pt does not speak english and needs a [employer] . SW contacted language line at [phone].  name is Cherry, and id number is [id number]. Pt reports she is in need of food assistance for she and her kids. Pt has applied for [facility] and has an appointment scheduled for 7/17. RHIANNON educated pt on SNAP and FITAP. RHIANNON pulled up website and instructed pt she will need to create an account before applying for SNAP and FITAP. RHIANNON also provided pt with information for the food pantry and placed a referral for [employer]. RHIANNON provided pt with SW name and number for future questions.

## 2024-07-13 LAB — BACTERIA SPEC AEROBE CULT: NORMAL

## 2024-07-16 ENCOUNTER — TELEPHONE (OUTPATIENT)
Dept: OBSTETRICS AND GYNECOLOGY | Facility: CLINIC | Age: 24
End: 2024-07-16
Payer: MEDICAID

## 2024-07-16 DIAGNOSIS — B18.1 HEPATITIS B CARRIER: Primary | ICD-10-CM

## 2024-07-18 ENCOUNTER — OFFICE VISIT (OUTPATIENT)
Dept: MATERNAL FETAL MEDICINE | Facility: CLINIC | Age: 24
End: 2024-07-18
Payer: MEDICAID

## 2024-07-18 ENCOUNTER — PROCEDURE VISIT (OUTPATIENT)
Dept: MATERNAL FETAL MEDICINE | Facility: CLINIC | Age: 24
End: 2024-07-18
Payer: MEDICAID

## 2024-07-18 VITALS
WEIGHT: 154.31 LBS | BODY MASS INDEX: 24.22 KG/M2 | HEIGHT: 67 IN | DIASTOLIC BLOOD PRESSURE: 62 MMHG | SYSTOLIC BLOOD PRESSURE: 103 MMHG

## 2024-07-18 DIAGNOSIS — Z36.89 ENCOUNTER FOR ULTRASOUND TO ASSESS FETAL GROWTH: ICD-10-CM

## 2024-07-18 DIAGNOSIS — B18.1 HEPATITIS B CARRIER: ICD-10-CM

## 2024-07-18 DIAGNOSIS — Z34.80 SUPERVISION OF OTHER NORMAL PREGNANCY, ANTEPARTUM: ICD-10-CM

## 2024-07-18 DIAGNOSIS — B18.1 HEPATITIS B CARRIER: Primary | ICD-10-CM

## 2024-07-18 PROCEDURE — 76816 OB US FOLLOW-UP PER FETUS: CPT | Mod: PBBFAC | Performed by: OBSTETRICS & GYNECOLOGY

## 2024-07-18 PROCEDURE — 99212 OFFICE O/P EST SF 10 MIN: CPT | Mod: PBBFAC,TH,25 | Performed by: OBSTETRICS & GYNECOLOGY

## 2024-07-18 PROCEDURE — 99999 PR PBB SHADOW E&M-EST. PATIENT-LVL II: CPT | Mod: PBBFAC,,, | Performed by: OBSTETRICS & GYNECOLOGY

## 2024-07-18 NOTE — ASSESSMENT & PLAN NOTE
See prior consult for full recommendations.  Tenofovir not indicated as per initial consult for Hep B with current viral load.   Please refer patient to hepatology if not already done.    Intrapartum management  Hepatitis B infection is not an indication for elective   Avoid (if possible) or minimize use of early amniotomy, fetal scalp electrode, or operative vaginal delivery  Check CMP on admit for L&D     Postpartum  Infants should receive Hepatitis B Immune Globulin (HBIG) in addition to the standard Hepatitis B vaccine series within 12 hours of birth.

## 2024-07-18 NOTE — PROGRESS NOTES
"Maternal Fetal Medicine follow up consult    SUBJECTIVE:     Sheron Frankel is a 24 y.o.  female with IUP at 36w3d who is seen in follow up consultation by MFM.  Pregnancy complications include:   Problem   Hepatitis B Carrier   Supervision of Other Normal Pregnancy, Antepartum       Previous notes reviewed.   No changes to medical, surgical, family, social, or obstetric history.    Interval history since last MFM visit: Concerned re: financial ability to support baby.    Medications reviewed.    Care team members:  Dr. Rangel - Primary OB     OBJECTIVE:   /62 (BP Location: Left arm, Patient Position: Sitting, BP Method: Medium (Automatic))   Ht 5' 7" (1.702 m)   Wt 70 kg (154 lb 5.2 oz)   LMP 12/15/2023   BMI 24.17 kg/m²     Physical Exam    Ultrasound performed. See viewpoint for full ultrasound report.    Significant labs/imaging:      ASSESSMENT/PLAN:     24 y.o.  female with IUP at 36w3d    Hepatitis B carrier  See prior consult for full recommendations.  Tenofovir not indicated as per initial consult for Hep B with current viral load.   Please refer patient to hepatology if not already done.    Intrapartum management  Hepatitis B infection is not an indication for elective   Avoid (if possible) or minimize use of early amniotomy, fetal scalp electrode, or operative vaginal delivery  Check CMP on admit for L&D     Postpartum  Infants should receive Hepatitis B Immune Globulin (HBIG) in addition to the standard Hepatitis B vaccine series within 12 hours of birth.      Supervision of other normal pregnancy, antepartum  AGA growth with normal anatomy.  Female fetus per cfDNA. Imaging today appears consistent with female genitalia; previously noted concern for hypertrophy of the labia minora has resolved.    Patient has significant financial concerns once baby is born. Will consult social work again today.    F/u PRN  Certified  utilized for consult.  "

## 2024-07-18 NOTE — ASSESSMENT & PLAN NOTE
AGA growth with normal anatomy.  Female fetus per cfDNA. Imaging today appears consistent with female genitalia; previously noted concern for hypertrophy of the labia minora has resolved.    Patient has significant financial concerns once baby is born. Will consult social work again today.

## 2024-07-19 ENCOUNTER — SOCIAL WORK (OUTPATIENT)
Dept: CASE MANAGEMENT | Facility: OTHER | Age: 24
End: 2024-07-19
Payer: MEDICAID

## 2024-07-19 NOTE — PROGRESS NOTES
Called received from MarcelleRN with M concerning pt's need for resources. SW met with pt again using language line at [phone]. Interpretor name is Misty and id number [id number]. Pt with same concerns as previous. Pt reports she has no money and having trouble buying things for baby. Pt did not follow-up on any of the resources I provided her previously. SW provided resources for SNAP, [employer], and [employer]. Pt expressed she is unsure if [employer] reached out to her because she changed her number. SW encouraged her to contact [employer] and provide new number. SW explained to pt, she will need someone to interpret for her when she call these places. Pt verbalized understanding.     SW inquired about pt being late for her appointments. Per pt she catches the bus. RHIANNON educated pt on Medicaid transportation and informed pt SW will contact her tomorrow to provide number for her Medicaid plan so she can arrange transportation. SW informed pt, transportation has to be arranged 48 hrs in advance.     7/19  Using the language line, RHIANNON contacted pt with interpretor, Jovan, and id [id number] to provide information for Medicaid transportation. Pt did not answer the phone and interpretor left a voicemail.

## 2024-07-23 ENCOUNTER — ROUTINE PRENATAL (OUTPATIENT)
Dept: OBSTETRICS AND GYNECOLOGY | Facility: CLINIC | Age: 24
End: 2024-07-23
Attending: OBSTETRICS & GYNECOLOGY
Payer: MEDICAID

## 2024-07-23 VITALS
DIASTOLIC BLOOD PRESSURE: 56 MMHG | SYSTOLIC BLOOD PRESSURE: 96 MMHG | WEIGHT: 157.88 LBS | BODY MASS INDEX: 24.72 KG/M2 | HEART RATE: 86 BPM

## 2024-07-23 DIAGNOSIS — O09.32 LATE PRENATAL CARE AFFECTING PREGNANCY IN SECOND TRIMESTER: ICD-10-CM

## 2024-07-23 DIAGNOSIS — Z34.80 SUPERVISION OF OTHER NORMAL PREGNANCY, ANTEPARTUM: Primary | ICD-10-CM

## 2024-07-23 DIAGNOSIS — B18.1 HEPATITIS B CARRIER: ICD-10-CM

## 2024-07-23 PROCEDURE — 99213 OFFICE O/P EST LOW 20 MIN: CPT | Mod: PBBFAC,TH | Performed by: OBSTETRICS & GYNECOLOGY

## 2024-07-23 PROCEDURE — 99213 OFFICE O/P EST LOW 20 MIN: CPT | Mod: TH,S$PBB,, | Performed by: OBSTETRICS & GYNECOLOGY

## 2024-07-23 PROCEDURE — 99999 PR PBB SHADOW E&M-EST. PATIENT-LVL III: CPT | Mod: PBBFAC,,, | Performed by: OBSTETRICS & GYNECOLOGY

## 2024-07-23 NOTE — PROGRESS NOTES
Chief Complaint   Patient presents with    Routine Prenatal Visit       24 y.o., at 37w1d by Estimated Date of Delivery: 24    Complaints today: none    ROS  OBSTETRICS:   Contractions none   Bleeding none   Loss of fluid none   Fetal mvmnt good  GASTRO:   Nausea none   Vomiting none      OB History    Para Term  AB Living   3 2 1     2   SAB IAB Ectopic Multiple Live Births           2      # Outcome Date GA Lbr Marshall/2nd Weight Sex Type Anes PTL Lv   3 Current            2 Term 21 40w0d   M Vag-Spont OTHER  RONY   1 Para 19 40w0d   F Vag-Spont  N RONY       Dating reviewed  Allergies and problem list reviewed and updated  Medical and surgical history reviewed  Prenatal labs reviewed and updated    PHYSICAL EXAM  BP (!) 96/56   Pulse 86   Wt 71.6 kg (157 lb 13.6 oz)   LMP 12/15/2023   BMI 24.72 kg/m²     GENERAL: No acute distress  HEENT: Normocephalic  NEURO: Alert and oriented x3  PSYCH: Normal mood and affect  PULMONARY: Non-labored respiration; no tachypnea  ABD: Soft, gravid, nontender; no hernia or hepatosplenomegaly    ASSESSMENT AND PLAN     Problems (from 06/10/24 to present)       No problems associated with this episode.            Labor precautions given  Follow-up: 1 weeks

## 2024-07-30 ENCOUNTER — ROUTINE PRENATAL (OUTPATIENT)
Dept: OBSTETRICS AND GYNECOLOGY | Facility: CLINIC | Age: 24
End: 2024-07-30
Attending: OBSTETRICS & GYNECOLOGY
Payer: MEDICAID

## 2024-07-30 VITALS
WEIGHT: 162.25 LBS | DIASTOLIC BLOOD PRESSURE: 82 MMHG | SYSTOLIC BLOOD PRESSURE: 130 MMHG | BODY MASS INDEX: 25.41 KG/M2

## 2024-07-30 DIAGNOSIS — O09.32 LATE PRENATAL CARE AFFECTING PREGNANCY IN SECOND TRIMESTER: ICD-10-CM

## 2024-07-30 DIAGNOSIS — Z34.80 SUPERVISION OF OTHER NORMAL PREGNANCY, ANTEPARTUM: Primary | ICD-10-CM

## 2024-07-30 DIAGNOSIS — B18.1 HEPATITIS B CARRIER: ICD-10-CM

## 2024-07-30 PROCEDURE — 99212 OFFICE O/P EST SF 10 MIN: CPT | Mod: PBBFAC,TH | Performed by: OBSTETRICS & GYNECOLOGY

## 2024-07-30 PROCEDURE — 99999 PR PBB SHADOW E&M-EST. PATIENT-LVL II: CPT | Mod: PBBFAC,,, | Performed by: OBSTETRICS & GYNECOLOGY

## 2024-07-30 PROCEDURE — 99213 OFFICE O/P EST LOW 20 MIN: CPT | Mod: TH,S$PBB,, | Performed by: OBSTETRICS & GYNECOLOGY

## 2024-08-01 NOTE — PROGRESS NOTES
Chief Complaint   Patient presents with    Routine Prenatal Visit       24 y.o., at 38w3d by Estimated Date of Delivery: 24    Complaints today: none    ROS  OBSTETRICS:   Contractions none   Bleeding none   Loss of fluid none   Fetal mvmnt good  GASTRO:   Nausea none   Vomiting none      OB History    Para Term  AB Living   3 2 1     2   SAB IAB Ectopic Multiple Live Births           2      # Outcome Date GA Lbr Marshall/2nd Weight Sex Type Anes PTL Lv   3 Current            2 Term 21 40w0d   M Vag-Spont OTHER  RONY   1 Para 19 40w0d   F Vag-Spont  N RONY       Dating reviewed  Allergies and problem list reviewed and updated  Medical and surgical history reviewed  Prenatal labs reviewed and updated    PHYSICAL EXAM  /82   Wt 73.6 kg (162 lb 4.1 oz)   LMP 12/15/2023   BMI 25.41 kg/m²     GENERAL: No acute distress  HEENT: Normocephalic  NEURO: Alert and oriented x3  PSYCH: Normal mood and affect  PULMONARY: Non-labored respiration; no tachypnea  ABD: Soft, gravid, nontender; no hernia or hepatosplenomegaly    ASSESSMENT AND PLAN     Problems (from 06/10/24 to present)       No problems associated with this episode.              Labor precautions given  PNT next week if still pregnant  Follow-up: 1 weeks   
No

## 2024-08-06 ENCOUNTER — ROUTINE PRENATAL (OUTPATIENT)
Dept: OBSTETRICS AND GYNECOLOGY | Facility: CLINIC | Age: 24
End: 2024-08-06
Attending: OBSTETRICS & GYNECOLOGY
Payer: MEDICAID

## 2024-08-06 ENCOUNTER — HOSPITAL ENCOUNTER (OUTPATIENT)
Dept: PERINATAL CARE | Facility: OTHER | Age: 24
Discharge: HOME | End: 2024-08-06
Attending: OBSTETRICS & GYNECOLOGY
Payer: MEDICAID

## 2024-08-06 VITALS
BODY MASS INDEX: 24.79 KG/M2 | SYSTOLIC BLOOD PRESSURE: 110 MMHG | WEIGHT: 158.31 LBS | DIASTOLIC BLOOD PRESSURE: 52 MMHG

## 2024-08-06 DIAGNOSIS — Z34.80 SUPERVISION OF OTHER NORMAL PREGNANCY, ANTEPARTUM: ICD-10-CM

## 2024-08-06 DIAGNOSIS — B18.1 HEPATITIS B CARRIER: ICD-10-CM

## 2024-08-06 DIAGNOSIS — O09.32 LATE PRENATAL CARE AFFECTING PREGNANCY IN SECOND TRIMESTER: ICD-10-CM

## 2024-08-06 DIAGNOSIS — Z34.80 SUPERVISION OF OTHER NORMAL PREGNANCY, ANTEPARTUM: Primary | ICD-10-CM

## 2024-08-06 PROCEDURE — 76815 OB US LIMITED FETUS(S): CPT | Mod: 26,,, | Performed by: OBSTETRICS & GYNECOLOGY

## 2024-08-06 PROCEDURE — 76815 OB US LIMITED FETUS(S): CPT

## 2024-08-06 PROCEDURE — 99213 OFFICE O/P EST LOW 20 MIN: CPT | Mod: PBBFAC,25,TH | Performed by: OBSTETRICS & GYNECOLOGY

## 2024-08-06 PROCEDURE — 59025 FETAL NON-STRESS TEST: CPT | Mod: 26,,, | Performed by: OBSTETRICS & GYNECOLOGY

## 2024-08-06 PROCEDURE — 99999 PR PBB SHADOW E&M-EST. PATIENT-LVL III: CPT | Mod: PBBFAC,,, | Performed by: OBSTETRICS & GYNECOLOGY

## 2024-08-06 PROCEDURE — 59025 FETAL NON-STRESS TEST: CPT

## 2024-08-09 ENCOUNTER — HOSPITAL ENCOUNTER (EMERGENCY)
Facility: OTHER | Age: 24
Discharge: HOME | End: 2024-08-09
Attending: OBSTETRICS & GYNECOLOGY
Payer: MEDICAID

## 2024-08-09 VITALS
DIASTOLIC BLOOD PRESSURE: 53 MMHG | TEMPERATURE: 98 F | SYSTOLIC BLOOD PRESSURE: 92 MMHG | RESPIRATION RATE: 16 BRPM | HEART RATE: 89 BPM | OXYGEN SATURATION: 100 %

## 2024-08-09 DIAGNOSIS — O47.9 IRREGULAR UTERINE CONTRACTIONS: Primary | ICD-10-CM

## 2024-08-09 DIAGNOSIS — Z3A.39 39 WEEKS GESTATION OF PREGNANCY: ICD-10-CM

## 2024-08-09 PROCEDURE — 25000003 PHARM REV CODE 250

## 2024-08-09 PROCEDURE — 59025 FETAL NON-STRESS TEST: CPT

## 2024-08-09 PROCEDURE — 99284 EMERGENCY DEPT VISIT MOD MDM: CPT | Mod: 25

## 2024-08-09 RX ORDER — ACETAMINOPHEN 500 MG
1000 TABLET ORAL EVERY 6 HOURS PRN
Status: DISCONTINUED | OUTPATIENT
Start: 2024-08-09 | End: 2024-08-09 | Stop reason: HOSPADM

## 2024-08-09 RX ADMIN — ACETAMINOPHEN 1000 MG: 500 TABLET ORAL at 06:08

## 2024-08-09 NOTE — ED PROVIDER NOTES
Encounter Date: 2024       History     Chief Complaint   Patient presents with    Contractions     Sheron Frankel is a 24 y.o.  at 39w4d who presents to the OB ED today (2024) with CC of pelvic pain radiating to back starting a few hours ago. Described as cramping contractions.     Patient also with mild headache. She reports no vaginal bleeding, no leakage of fluid. Good fetal movement.    She has had some diarrhea recently that is non-bloody and a mild headache. No n/v/fevers/chills.    This pregnancy is complicated by hep B.            Review of patient's allergies indicates:  No Known Allergies  Past Medical History:   Diagnosis Date    Unspecified viral hepatitis B without hepatic coma      No past surgical history on file.  No family history on file.  Social History     Tobacco Use    Smoking status: Never     Passive exposure: Never    Smokeless tobacco: Never   Substance Use Topics    Alcohol use: Not Currently    Drug use: Not Currently     Review of Systems   Constitutional:  Negative for chills and fever.   Gastrointestinal:  Positive for abdominal pain. Negative for blood in stool, diarrhea, nausea and vomiting.   Genitourinary:  Negative for dysuria, frequency and pelvic pain.       Physical Exam     Initial Vitals   BP Pulse Resp Temp SpO2   24 1742 24 1741 24 1749 24 1749 24 1741   (!) 102/59 84 16 98.2 °F (36.8 °C) 100 %      MAP       --                Physical Exam    Vitals reviewed.  Constitutional: She appears well-developed and well-nourished. She is not diaphoretic. No distress.   HENT:   Head: Normocephalic and atraumatic.   Eyes: EOM are normal.   Neck:   Normal range of motion.  Cardiovascular:  Normal rate.           Pulmonary/Chest: No respiratory distress.   Abdominal: Abdomen is soft. There is no abdominal tenderness.   Musculoskeletal:         General: Normal range of motion.      Cervical back: Normal range of motion.     Neurological: She  is oriented to person, place, and time.   Skin: Skin is warm and dry.   Psychiatric: She has a normal mood and affect.         ED Course   Obtain Fetal nonstress test (NST)    Date/Time: 2024 7:30 PM    Performed by: Maribell Gonzalez MD  Authorized by: Maribell Gonzalez MD    Nonstress Test:     Variability:  6-25 BPM    Decelerations:  None    Accelerations:  15 bpm    Baseline:  125    Uterine Irritability: Yes      Contractions:  Not present  Biophysical Profile:     Nonstress Test Interpretation: reactive      Overall Impression:  Reassuring    Labs Reviewed - No data to display       Imaging Results    None          Medications - No data to display  Medical Decision Making  Sheron Frankel is a 24 y.o.  at 39w4d who presents to the OB ED today (2024) with CC of pelvic pain radiating to back starting today.    Temp:  [98.2 °F (36.8 °C)] 98.2 °F (36.8 °C)  Pulse:  [84-94] 90  Resp:  [16] 16  SpO2:  [100 %] 100 %  BP: (102)/(59) 102/59    1) Rule Out Labor  - Patient with back and abdominal pain starting a few hours ago, notes this is similar to prior labors  - NST - 135, +accels, reassuring  - Irregularly neo on monitor  - Spec exam - negative  - SVE - closed/high  - Patient stable for discharge at this time  - ED return precautions given  - She voiced understanding and is in agreement with the plan  - She will follow up in clinic    2) Headache  - improved with tylenol     - Patient stable for discharge at this time  - ED return precautions given  - She voiced understanding and is in agreement with the plan      Maribell Gonzalez MD  OBGYN   PGY-1      Risk  OTC drugs.              Attending Attestation:   Physician Attestation Statement for Resident:  As the supervising MD   Physician Attestation Statement: I have personally seen and examined this patient.   I agree with the above history.  -:   As the supervising MD I agree with the above PE.     As the supervising MD I agree with the above  treatment, course, plan, and disposition.   -:     NST reactive and reassuring, tocometer with occasional irregular contractions.  Exam not consistent with labor, patient appears comfortable in bed.   used for patient interview.  Agree with discharge to home, discussed reasons to return to the OB ED for evaluation.    Follow up with OB in 1 week.    Le Fair MD,SANJANA  Date and Time: 08/11/2024 11:52 AM  OB Hospitalist  I was personally present during the critical portions of the procedure(s) performed by the resident and was immediately available in the ED to provide services and assistance as needed during the entire procedure.   I have reviewed the following: old records at this facility.                                       Clinical Impression:  Final diagnoses:  [O47.9] Irregular uterine contractions (Primary)  [Z3A.39] 39 weeks gestation of pregnancy          ED Disposition Condition    Discharge Stable          ED Prescriptions    None       Follow-up Information    None          Maribell Gonzalez MD  Resident  08/10/24 [id number]       Le Fair MD  08/11/24 [id number]

## 2024-08-13 ENCOUNTER — ROUTINE PRENATAL (OUTPATIENT)
Dept: OBSTETRICS AND GYNECOLOGY | Facility: CLINIC | Age: 24
End: 2024-08-13
Attending: OBSTETRICS & GYNECOLOGY
Payer: MEDICAID

## 2024-08-13 ENCOUNTER — HOSPITAL ENCOUNTER (OUTPATIENT)
Dept: PERINATAL CARE | Facility: OTHER | Age: 24
Discharge: HOME | End: 2024-08-13
Attending: OBSTETRICS & GYNECOLOGY
Payer: MEDICAID

## 2024-08-13 VITALS
DIASTOLIC BLOOD PRESSURE: 60 MMHG | SYSTOLIC BLOOD PRESSURE: 100 MMHG | WEIGHT: 157.19 LBS | BODY MASS INDEX: 24.62 KG/M2

## 2024-08-13 DIAGNOSIS — O09.32 LATE PRENATAL CARE AFFECTING PREGNANCY IN SECOND TRIMESTER: ICD-10-CM

## 2024-08-13 DIAGNOSIS — B18.1 HEPATITIS B CARRIER: ICD-10-CM

## 2024-08-13 DIAGNOSIS — Z34.80 SUPERVISION OF OTHER NORMAL PREGNANCY, ANTEPARTUM: Primary | ICD-10-CM

## 2024-08-13 DIAGNOSIS — Z34.80 SUPERVISION OF OTHER NORMAL PREGNANCY, ANTEPARTUM: ICD-10-CM

## 2024-08-13 DIAGNOSIS — O09.32 LATE PRENATAL CARE AFFECTING PREGNANCY IN SECOND TRIMESTER: Primary | ICD-10-CM

## 2024-08-13 PROCEDURE — 76815 OB US LIMITED FETUS(S): CPT

## 2024-08-13 PROCEDURE — 59025 FETAL NON-STRESS TEST: CPT | Mod: 26,,, | Performed by: OBSTETRICS & GYNECOLOGY

## 2024-08-13 PROCEDURE — 99213 OFFICE O/P EST LOW 20 MIN: CPT | Mod: 25,TH,S$PBB, | Performed by: OBSTETRICS & GYNECOLOGY

## 2024-08-13 PROCEDURE — 99999 PR PBB SHADOW E&M-EST. PATIENT-LVL III: CPT | Mod: PBBFAC,,, | Performed by: OBSTETRICS & GYNECOLOGY

## 2024-08-13 PROCEDURE — 99213 OFFICE O/P EST LOW 20 MIN: CPT | Mod: PBBFAC,25,TH | Performed by: OBSTETRICS & GYNECOLOGY

## 2024-08-13 PROCEDURE — 76815 OB US LIMITED FETUS(S): CPT | Mod: 26,,, | Performed by: OBSTETRICS & GYNECOLOGY

## 2024-08-13 PROCEDURE — 59025 FETAL NON-STRESS TEST: CPT

## 2024-08-13 NOTE — PROGRESS NOTES
Chief Complaint   Patient presents with    Routine Prenatal Visit       24 y.o., at 40w3d by Estimated Date of Delivery: 24    Complaints today: none    ROS  OBSTETRICS:   Contractions yes   Bleeding none   Loss of fluid none   Fetal mvmnt good  GASTRO:   Nausea none   Vomiting none      OB History    Para Term  AB Living   3 2 1     2   SAB IAB Ectopic Multiple Live Births           2      # Outcome Date GA Lbr Marshall/2nd Weight Sex Type Anes PTL Lv   3 Current            2 Term 21 40w0d   M Vag-Spont OTHER  RONY   1 Para 19 40w0d   F Vag-Spont  N RONY       Dating reviewed  Allergies and problem list reviewed and updated  Medical and surgical history reviewed  Prenatal labs reviewed and updated    PHYSICAL EXAM  /60   Wt 71.3 kg (157 lb 3 oz)   LMP 12/15/2023   BMI 24.62 kg/m²     GENERAL: No acute distress  HEENT: Normocephalic  NEURO: Alert and oriented x3  PSYCH: Normal mood and affect  PULMONARY: Non-labored respiration; no tachypnea  ABD: Soft, gravid, nontender; no hernia or hepatosplenomegaly    ASSESSMENT AND PLAN     Problems (from 06/10/24 to present)       No problems associated with this episode.            PNT today  Labor precautions given  Follow-up: 1 weeks   Will discuss timing of induction, unsure EDC, etc.   Has transportation and childcare issues.

## 2024-08-15 ENCOUNTER — TELEPHONE (OUTPATIENT)
Dept: OBSTETRICS AND GYNECOLOGY | Facility: CLINIC | Age: 24
End: 2024-08-15
Payer: MEDICAID

## 2024-08-15 NOTE — TELEPHONE ENCOUNTER
I call the Language line to notify patient provider is scheduling induction 08/16/2024 for 4:00 pm. Access  #[id number] to communicate information and arrival location. Patient states she would need a ride(she inform me she had a phone number to call for transportation). Patient communicated understanding of time, date, location and reason for arrival(Delivery)

## 2024-08-16 ENCOUNTER — PATIENT MESSAGE (OUTPATIENT)
Dept: OBSTETRICS AND GYNECOLOGY | Facility: OTHER | Age: 24
End: 2024-08-16
Payer: MEDICAID

## 2024-08-16 ENCOUNTER — ANESTHESIA EVENT (OUTPATIENT)
Dept: OBSTETRICS AND GYNECOLOGY | Facility: OTHER | Age: 24
End: 2024-08-16
Payer: MEDICAID

## 2024-08-16 ENCOUNTER — ANESTHESIA (OUTPATIENT)
Dept: OBSTETRICS AND GYNECOLOGY | Facility: OTHER | Age: 24
End: 2024-08-16
Payer: MEDICAID

## 2024-08-16 ENCOUNTER — HOSPITAL ENCOUNTER (INPATIENT)
Facility: OTHER | Age: 24
LOS: 3 days | Discharge: HOME | End: 2024-08-19
Attending: OBSTETRICS & GYNECOLOGY | Admitting: OBSTETRICS & GYNECOLOGY
Payer: MEDICAID

## 2024-08-16 DIAGNOSIS — Z34.90 ENCOUNTER FOR INDUCTION OF LABOR: ICD-10-CM

## 2024-08-16 LAB
ABO + RH BLD: NORMAL
ALBUMIN SERPL BCP-MCNC: 3 G/DL (ref 3.5–5.2)
ALP SERPL-CCNC: 151 U/L (ref 55–135)
ALT SERPL W/O P-5'-P-CCNC: 10 U/L (ref 10–44)
ANION GAP SERPL CALC-SCNC: 8 MMOL/L (ref 8–16)
AST SERPL-CCNC: 14 U/L (ref 10–40)
BASOPHILS # BLD AUTO: 0.02 K/UL (ref 0–0.2)
BASOPHILS NFR BLD: 0.2 % (ref 0–1.9)
BILIRUB SERPL-MCNC: 0.6 MG/DL (ref 0.1–1)
BLD GP AB SCN CELLS X3 SERPL QL: NORMAL
BUN SERPL-MCNC: 4 MG/DL (ref 6–20)
CALCIUM SERPL-MCNC: 8.5 MG/DL (ref 8.7–10.5)
CHLORIDE SERPL-SCNC: 108 MMOL/L (ref 95–110)
CO2 SERPL-SCNC: 19 MMOL/L (ref 23–29)
CREAT SERPL-MCNC: 0.6 MG/DL (ref 0.5–1.4)
DIFFERENTIAL METHOD BLD: ABNORMAL
EOSINOPHIL # BLD AUTO: 0.1 K/UL (ref 0–0.5)
EOSINOPHIL NFR BLD: 0.5 % (ref 0–8)
ERYTHROCYTE [DISTWIDTH] IN BLOOD BY AUTOMATED COUNT: 13 % (ref 11.5–14.5)
EST. GFR  (NO RACE VARIABLE): >60 ML/MIN/1.73 M^2
GLUCOSE SERPL-MCNC: 72 MG/DL (ref 70–110)
HCT VFR BLD AUTO: 30.4 % (ref 37–48.5)
HGB BLD-MCNC: 9.7 G/DL (ref 12–16)
IMM GRANULOCYTES # BLD AUTO: 0.09 K/UL (ref 0–0.04)
IMM GRANULOCYTES NFR BLD AUTO: 1 % (ref 0–0.5)
LYMPHOCYTES # BLD AUTO: 2 K/UL (ref 1–4.8)
LYMPHOCYTES NFR BLD: 21.5 % (ref 18–48)
MCH RBC QN AUTO: 28.7 PG (ref 27–31)
MCHC RBC AUTO-ENTMCNC: 31.9 G/DL (ref 32–36)
MCV RBC AUTO: 90 FL (ref 82–98)
MONOCYTES # BLD AUTO: 0.8 K/UL (ref 0.3–1)
MONOCYTES NFR BLD: 8.8 % (ref 4–15)
NEUTROPHILS # BLD AUTO: 6.4 K/UL (ref 1.8–7.7)
NEUTROPHILS NFR BLD: 68 % (ref 38–73)
NRBC BLD-RTO: 0 /100 WBC
PLATELET # BLD AUTO: 226 K/UL (ref 150–450)
PMV BLD AUTO: 10.4 FL (ref 9.2–12.9)
POTASSIUM SERPL-SCNC: 3.3 MMOL/L (ref 3.5–5.1)
PROT SERPL-MCNC: 6.8 G/DL (ref 6–8.4)
RBC # BLD AUTO: 3.38 M/UL (ref 4–5.4)
SODIUM SERPL-SCNC: 135 MMOL/L (ref 136–145)
SPECIMEN OUTDATE: NORMAL
TREPONEMA PALLIDUM IGG+IGM AB [PRESENCE] IN SERUM OR PLASMA BY IMMUNOASSAY: NONREACTIVE
WBC # BLD AUTO: 9.39 K/UL (ref 3.9–12.7)

## 2024-08-16 PROCEDURE — 11000001 HC ACUTE MED/SURG PRIVATE ROOM

## 2024-08-16 PROCEDURE — 51702 INSERT TEMP BLADDER CATH: CPT

## 2024-08-16 PROCEDURE — 63600175 PHARM REV CODE 636 W HCPCS

## 2024-08-16 PROCEDURE — 80053 COMPREHEN METABOLIC PANEL: CPT

## 2024-08-16 PROCEDURE — 86901 BLOOD TYPING SEROLOGIC RH(D): CPT

## 2024-08-16 PROCEDURE — 86593 SYPHILIS TEST NON-TREP QUANT: CPT | Performed by: OBSTETRICS & GYNECOLOGY

## 2024-08-16 PROCEDURE — 85025 COMPLETE CBC W/AUTO DIFF WBC: CPT

## 2024-08-16 PROCEDURE — 25000003 PHARM REV CODE 250

## 2024-08-16 PROCEDURE — 27200710 HC EPIDURAL INFUSION PUMP SET: Performed by: ANESTHESIOLOGY

## 2024-08-16 PROCEDURE — 86850 RBC ANTIBODY SCREEN: CPT

## 2024-08-16 PROCEDURE — 62326 NJX INTERLAMINAR LMBR/SAC: CPT

## 2024-08-16 PROCEDURE — 59409 OBSTETRICAL CARE: CPT | Mod: AA,,, | Performed by: ANESTHESIOLOGY

## 2024-08-16 PROCEDURE — 86900 BLOOD TYPING SEROLOGIC ABO: CPT

## 2024-08-16 PROCEDURE — C1751 CATH, INF, PER/CENT/MIDLINE: HCPCS | Performed by: ANESTHESIOLOGY

## 2024-08-16 RX ORDER — OXYTOCIN-SODIUM CHLORIDE 0.9% IV SOLN 30 UNIT/500ML 30-0.9/5 UT/ML-%
10 SOLUTION INTRAVENOUS ONCE AS NEEDED
Status: DISCONTINUED | OUTPATIENT
Start: 2024-08-16 | End: 2024-08-19 | Stop reason: HOSPADM

## 2024-08-16 RX ORDER — METHYLERGONOVINE MALEATE 0.2 MG/ML
200 INJECTION INTRAVENOUS ONCE AS NEEDED
Status: DISCONTINUED | OUTPATIENT
Start: 2024-08-16 | End: 2024-08-19 | Stop reason: HOSPADM

## 2024-08-16 RX ORDER — MISOPROSTOL 200 UG/1
800 TABLET ORAL ONCE AS NEEDED
Status: DISCONTINUED | OUTPATIENT
Start: 2024-08-16 | End: 2024-08-19 | Stop reason: HOSPADM

## 2024-08-16 RX ORDER — SODIUM CHLORIDE, SODIUM LACTATE, POTASSIUM CHLORIDE, CALCIUM CHLORIDE 600; 310; 30; 20 MG/100ML; MG/100ML; MG/100ML; MG/100ML
INJECTION, SOLUTION INTRAVENOUS CONTINUOUS
Status: DISCONTINUED | OUTPATIENT
Start: 2024-08-16 | End: 2024-08-19

## 2024-08-16 RX ORDER — OXYTOCIN 10 [USP'U]/ML
10 INJECTION, SOLUTION INTRAMUSCULAR; INTRAVENOUS ONCE AS NEEDED
Status: DISCONTINUED | OUTPATIENT
Start: 2024-08-16 | End: 2024-08-19 | Stop reason: HOSPADM

## 2024-08-16 RX ORDER — SODIUM CITRATE AND CITRIC ACID MONOHYDRATE 334; 500 MG/5ML; MG/5ML
30 SOLUTION ORAL ONCE
Status: CANCELLED | OUTPATIENT
Start: 2024-08-16 | End: 2024-08-16

## 2024-08-16 RX ORDER — TRANEXAMIC ACID 10 MG/ML
1000 INJECTION, SOLUTION INTRAVENOUS EVERY 30 MIN PRN
Status: DISCONTINUED | OUTPATIENT
Start: 2024-08-16 | End: 2024-08-19 | Stop reason: HOSPADM

## 2024-08-16 RX ORDER — CARBOPROST TROMETHAMINE 250 UG/ML
250 INJECTION, SOLUTION INTRAMUSCULAR
Status: DISCONTINUED | OUTPATIENT
Start: 2024-08-16 | End: 2024-08-19 | Stop reason: HOSPADM

## 2024-08-16 RX ORDER — OXYTOCIN-SODIUM CHLORIDE 0.9% IV SOLN 30 UNIT/500ML 30-0.9/5 UT/ML-%
10 SOLUTION INTRAVENOUS ONCE
Status: DISCONTINUED | OUTPATIENT
Start: 2024-08-16 | End: 2024-08-19

## 2024-08-16 RX ORDER — SODIUM CHLORIDE 9 MG/ML
INJECTION, SOLUTION INTRAVENOUS
Status: DISCONTINUED | OUTPATIENT
Start: 2024-08-16 | End: 2024-08-19 | Stop reason: HOSPADM

## 2024-08-16 RX ORDER — ONDANSETRON 8 MG/1
8 TABLET, ORALLY DISINTEGRATING ORAL EVERY 8 HOURS PRN
Status: DISCONTINUED | OUTPATIENT
Start: 2024-08-16 | End: 2024-08-19 | Stop reason: HOSPADM

## 2024-08-16 RX ORDER — FAMOTIDINE 10 MG/ML
20 INJECTION INTRAVENOUS ONCE
Status: CANCELLED | OUTPATIENT
Start: 2024-08-16 | End: 2024-08-16

## 2024-08-16 RX ORDER — FENTANYL/BUPIVACAINE/NS/PF 2MCG/ML-.1
PLASTIC BAG, INJECTION (ML) INJECTION
Status: DISCONTINUED | OUTPATIENT
Start: 2024-08-16 | End: 2024-08-17

## 2024-08-16 RX ORDER — DIPHENOXYLATE HYDROCHLORIDE AND ATROPINE SULFATE 2.5; .025 MG/1; MG/1
2 TABLET ORAL EVERY 6 HOURS PRN
Status: DISCONTINUED | OUTPATIENT
Start: 2024-08-16 | End: 2024-08-19 | Stop reason: HOSPADM

## 2024-08-16 RX ORDER — OXYTOCIN-SODIUM CHLORIDE 0.9% IV SOLN 30 UNIT/500ML 30-0.9/5 UT/ML-%
95 SOLUTION INTRAVENOUS ONCE
Status: DISCONTINUED | OUTPATIENT
Start: 2024-08-16 | End: 2024-08-19

## 2024-08-16 RX ORDER — CALCIUM CARBONATE 200(500)MG
500 TABLET,CHEWABLE ORAL 3 TIMES DAILY PRN
Status: DISCONTINUED | OUTPATIENT
Start: 2024-08-16 | End: 2024-08-19 | Stop reason: HOSPADM

## 2024-08-16 RX ORDER — SIMETHICONE 80 MG
1 TABLET,CHEWABLE ORAL 4 TIMES DAILY PRN
Status: DISCONTINUED | OUTPATIENT
Start: 2024-08-16 | End: 2024-08-17 | Stop reason: SDUPTHER

## 2024-08-16 RX ORDER — LIDOCAINE HYDROCHLORIDE 10 MG/ML
10 INJECTION, SOLUTION INFILTRATION; PERINEURAL ONCE AS NEEDED
Status: DISCONTINUED | OUTPATIENT
Start: 2024-08-16 | End: 2024-08-19 | Stop reason: HOSPADM

## 2024-08-16 RX ORDER — FENTANYL/BUPIVACAINE/NS/PF 2MCG/ML-.1
PLASTIC BAG, INJECTION (ML) INJECTION
Status: COMPLETED
Start: 2024-08-16 | End: 2024-08-16

## 2024-08-16 RX ORDER — OXYTOCIN-SODIUM CHLORIDE 0.9% IV SOLN 30 UNIT/500ML 30-0.9/5 UT/ML-%
95 SOLUTION INTRAVENOUS ONCE AS NEEDED
Status: DISCONTINUED | OUTPATIENT
Start: 2024-08-16 | End: 2024-08-19 | Stop reason: HOSPADM

## 2024-08-16 RX ORDER — OXYTOCIN-SODIUM CHLORIDE 0.9% IV SOLN 30 UNIT/500ML 30-0.9/5 UT/ML-%
0-32 SOLUTION INTRAVENOUS CONTINUOUS
Status: DISCONTINUED | OUTPATIENT
Start: 2024-08-16 | End: 2024-08-19

## 2024-08-16 RX ORDER — FENTANYL/BUPIVACAINE/NS/PF 2MCG/ML-.1
PLASTIC BAG, INJECTION (ML) INJECTION CONTINUOUS
Status: CANCELLED | OUTPATIENT
Start: 2024-08-16

## 2024-08-16 RX ORDER — LIDOCAINE HYDROCHLORIDE AND EPINEPHRINE 15; 5 MG/ML; UG/ML
INJECTION, SOLUTION EPIDURAL
Status: DISCONTINUED | OUTPATIENT
Start: 2024-08-16 | End: 2024-08-17

## 2024-08-16 RX ORDER — MISOPROSTOL 200 UG/1
800 TABLET ORAL ONCE AS NEEDED
Status: COMPLETED | OUTPATIENT
Start: 2024-08-16 | End: 2024-08-17

## 2024-08-16 RX ADMIN — Medication 8 ML/HR: at 10:08

## 2024-08-16 RX ADMIN — LIDOCAINE HYDROCHLORIDE,EPINEPHRINE BITARTRATE 3 ML: 15; .005 INJECTION, SOLUTION EPIDURAL; INFILTRATION; INTRACAUDAL; PERINEURAL at 10:08

## 2024-08-16 RX ADMIN — BUPIVACAINE HYDROCHLORIDE 8 ML: 2.5 INJECTION, SOLUTION EPIDURAL; INFILTRATION; INTRACAUDAL; PERINEURAL at 11:08

## 2024-08-16 RX ADMIN — SODIUM CHLORIDE, POTASSIUM CHLORIDE, SODIUM LACTATE AND CALCIUM CHLORIDE: 600; 310; 30; 20 INJECTION, SOLUTION INTRAVENOUS at 07:08

## 2024-08-16 RX ADMIN — Medication 5 ML: at 10:08

## 2024-08-16 RX ADMIN — Medication 4 MILLI-UNITS/MIN: at 10:08

## 2024-08-16 NOTE — ANESTHESIA PREPROCEDURE EVALUATION
"[facility]  Anesthesia Pre-Operative Evaluation         Patient Name: Sheron Frankel  YOB: 2000  MRN: [id number]    2024      Sheron Frankel is a 24 y.o. female  @ 40w4d who presents for IOL. HBV carrier     OB History    Para Term  AB Living   3 2 1     2   SAB IAB Ectopic Multiple Live Births           2      # Outcome Date GA Lbr Marshall/2nd Weight Sex Type Anes PTL Lv   3 Current            2 Term 21 40w0d   M Vag-Spont OTHER  RONY   1 Para 19 40w0d   F Vag-Spont  N RONY       Review of patient's allergies indicates:  No Known Allergies    Wt Readings from Last 1 Encounters:   24 1108 71.3 kg (157 lb 3 oz)       BP Readings from Last 3 Encounters:   24 100/60   24 (!) 92/53   24 (!) 110/52       Patient Active Problem List   Diagnosis    Late prenatal care affecting pregnancy in second trimester    Supervision of other normal pregnancy, antepartum    Hepatitis B carrier       No past surgical history on file.    Social History     Socioeconomic History    Marital status:    Tobacco Use    Smoking status: Never     Passive exposure: Never    Smokeless tobacco: Never   Substance and Sexual Activity    Alcohol use: Not Currently    Drug use: Not Currently    Sexual activity: Not Currently         Chemistry        Component Value Date/Time     04/15/2024 1347    K 3.9 04/15/2024 1347     04/15/2024 1347    CO2 24 04/15/2024 1347    BUN 4 (L) 04/15/2024 1347    CREATININE 0.6 04/15/2024 1347    GLU 78 04/15/2024 1347        Component Value Date/Time    CALCIUM 8.8 04/15/2024 1347    ALKPHOS 81 2024 1300    AST 14 2024 1300    ALT 8 (L) 2024 1300    BILITOT 0.7 2024 1300            Lab Results   Component Value Date    WBC 6.23 2024    HGB 10.2 (L) 2024    HCT 31.1 (L) 2024    MCV 91 2024     2024       No results for input(s): "PT", "INR", "PROTIME", " ""APTT" in the last 72 hours.          Pre-op Assessment    I have reviewed the Patient Summary Reports.     I have reviewed the Nursing Notes. I have reviewed the NPO Status.   I have reviewed the Medications.     Review of Systems  Anesthesia Hx:             Denies Family Hx of Anesthesia complications.    Denies Personal Hx of Anesthesia complications.                    Hepatic/GI:       Hepatitis               Physical Exam  General: Well nourished, Cooperative, Alert and Oriented    Airway:  Mallampati: II   Tongue: Normal    Dental:  Intact      Anesthesia Plan  Type of Anesthesia, risks & benefits discussed:    Anesthesia Type: Gen ETT, CSE, Spinal, Epidural  Intra-op Monitoring Plan: Standard ASA Monitors  Post Op Pain Control Plan: multimodal analgesia and IV/PO Opioids PRN  Induction:  IV  Airway Plan: Direct and Video  Informed Consent: Informed consent signed with the Patient and all parties understand the risks and agree with anesthesia plan.  All questions answered. Patient consented to blood products? Yes  ASA Score: 2  Day of Surgery Review of History & Physical: H&P Update referred to the surgeon/provider.    Ready For Surgery From Anesthesia Perspective.     .      "

## 2024-08-17 PROBLEM — Z34.90 ENCOUNTER FOR INDUCTION OF LABOR: Status: RESOLVED | Noted: 2024-08-16 | Resolved: 2024-08-17

## 2024-08-17 PROBLEM — O09.32 LATE PRENATAL CARE AFFECTING PREGNANCY IN SECOND TRIMESTER: Status: RESOLVED | Noted: 2024-05-24 | Resolved: 2024-08-17

## 2024-08-17 PROCEDURE — 51798 US URINE CAPACITY MEASURE: CPT

## 2024-08-17 PROCEDURE — 4A1HXCZ MONITORING OF PRODUCTS OF CONCEPTION, CARDIAC RATE, EXTERNAL APPROACH: ICD-10-PCS | Performed by: STUDENT IN AN ORGANIZED HEALTH CARE EDUCATION/TRAINING PROGRAM

## 2024-08-17 PROCEDURE — 72100002 HC LABOR CARE, 1ST 8 HOURS

## 2024-08-17 PROCEDURE — 59409 OBSTETRICAL CARE: CPT | Mod: GB,,, | Performed by: STUDENT IN AN ORGANIZED HEALTH CARE EDUCATION/TRAINING PROGRAM

## 2024-08-17 PROCEDURE — 11000001 HC ACUTE MED/SURG PRIVATE ROOM

## 2024-08-17 PROCEDURE — 63600175 PHARM REV CODE 636 W HCPCS

## 2024-08-17 PROCEDURE — 51701 INSERT BLADDER CATHETER: CPT

## 2024-08-17 PROCEDURE — 25000003 PHARM REV CODE 250

## 2024-08-17 PROCEDURE — 72200005 HC VAGINAL DELIVERY LEVEL II

## 2024-08-17 RX ORDER — SIMETHICONE 80 MG
1 TABLET,CHEWABLE ORAL EVERY 6 HOURS PRN
Status: DISCONTINUED | OUTPATIENT
Start: 2024-08-17 | End: 2024-08-19 | Stop reason: HOSPADM

## 2024-08-17 RX ORDER — HYDROCORTISONE 25 MG/G
CREAM TOPICAL 3 TIMES DAILY PRN
Status: DISCONTINUED | OUTPATIENT
Start: 2024-08-17 | End: 2024-08-19 | Stop reason: HOSPADM

## 2024-08-17 RX ORDER — CARBOPROST TROMETHAMINE 250 UG/ML
250 INJECTION, SOLUTION INTRAMUSCULAR
Status: CANCELLED | OUTPATIENT
Start: 2024-08-17

## 2024-08-17 RX ORDER — DOCUSATE SODIUM 100 MG/1
200 CAPSULE, LIQUID FILLED ORAL 2 TIMES DAILY PRN
Status: DISCONTINUED | OUTPATIENT
Start: 2024-08-17 | End: 2024-08-19 | Stop reason: HOSPADM

## 2024-08-17 RX ORDER — HYDROCODONE BITARTRATE AND ACETAMINOPHEN 5; 325 MG/1; MG/1
1 TABLET ORAL EVERY 4 HOURS PRN
Status: DISCONTINUED | OUTPATIENT
Start: 2024-08-17 | End: 2024-08-19 | Stop reason: HOSPADM

## 2024-08-17 RX ORDER — HYDROCODONE BITARTRATE AND ACETAMINOPHEN 10; 325 MG/1; MG/1
1 TABLET ORAL EVERY 4 HOURS PRN
Status: DISCONTINUED | OUTPATIENT
Start: 2024-08-17 | End: 2024-08-19 | Stop reason: HOSPADM

## 2024-08-17 RX ORDER — PRENATAL WITH FERROUS FUM AND FOLIC ACID 3080; 920; 120; 400; 22; 1.84; 3; 20; 10; 1; 12; 200; 27; 25; 2 [IU]/1; [IU]/1; MG/1; [IU]/1; MG/1; MG/1; MG/1; MG/1; MG/1; MG/1; UG/1; MG/1; MG/1; MG/1; MG/1
1 TABLET ORAL DAILY
Status: DISCONTINUED | OUTPATIENT
Start: 2024-08-17 | End: 2024-08-19 | Stop reason: HOSPADM

## 2024-08-17 RX ORDER — DIPHENHYDRAMINE HYDROCHLORIDE 50 MG/ML
25 INJECTION INTRAMUSCULAR; INTRAVENOUS EVERY 4 HOURS PRN
Status: DISCONTINUED | OUTPATIENT
Start: 2024-08-17 | End: 2024-08-19 | Stop reason: HOSPADM

## 2024-08-17 RX ORDER — OXYTOCIN 10 [USP'U]/ML
10 INJECTION, SOLUTION INTRAMUSCULAR; INTRAVENOUS ONCE AS NEEDED
Status: CANCELLED | OUTPATIENT
Start: 2024-08-17 | End: 2036-01-13

## 2024-08-17 RX ORDER — OXYTOCIN-SODIUM CHLORIDE 0.9% IV SOLN 30 UNIT/500ML 30-0.9/5 UT/ML-%
95 SOLUTION INTRAVENOUS ONCE
Status: DISCONTINUED | OUTPATIENT
Start: 2024-08-17 | End: 2024-08-19

## 2024-08-17 RX ORDER — SODIUM CHLORIDE 0.9 % (FLUSH) 0.9 %
10 SYRINGE (ML) INJECTION
Status: CANCELLED | OUTPATIENT
Start: 2024-08-17

## 2024-08-17 RX ORDER — METHYLERGONOVINE MALEATE 0.2 MG/ML
200 INJECTION INTRAVENOUS ONCE AS NEEDED
Status: CANCELLED | OUTPATIENT
Start: 2024-08-17 | End: 2036-01-13

## 2024-08-17 RX ORDER — DIPHENOXYLATE HYDROCHLORIDE AND ATROPINE SULFATE 2.5; .025 MG/1; MG/1
2 TABLET ORAL EVERY 6 HOURS PRN
Status: CANCELLED | OUTPATIENT
Start: 2024-08-17

## 2024-08-17 RX ORDER — ONDANSETRON 8 MG/1
8 TABLET, ORALLY DISINTEGRATING ORAL EVERY 8 HOURS PRN
Status: CANCELLED | OUTPATIENT
Start: 2024-08-17

## 2024-08-17 RX ORDER — OXYTOCIN-SODIUM CHLORIDE 0.9% IV SOLN 30 UNIT/500ML 30-0.9/5 UT/ML-%
95 SOLUTION INTRAVENOUS ONCE AS NEEDED
Status: CANCELLED | OUTPATIENT
Start: 2024-08-17 | End: 2036-01-13

## 2024-08-17 RX ORDER — MISOPROSTOL 200 UG/1
800 TABLET ORAL ONCE AS NEEDED
Status: CANCELLED | OUTPATIENT
Start: 2024-08-17

## 2024-08-17 RX ORDER — ACETAMINOPHEN 325 MG/1
650 TABLET ORAL EVERY 6 HOURS SCHEDULED
Status: DISCONTINUED | OUTPATIENT
Start: 2024-08-17 | End: 2024-08-19 | Stop reason: HOSPADM

## 2024-08-17 RX ORDER — MISOPROSTOL 200 UG/1
800 TABLET ORAL ONCE AS NEEDED
Status: CANCELLED | OUTPATIENT
Start: 2024-08-17 | End: 2036-01-13

## 2024-08-17 RX ORDER — TRANEXAMIC ACID 10 MG/ML
1000 INJECTION, SOLUTION INTRAVENOUS EVERY 30 MIN PRN
Status: CANCELLED | OUTPATIENT
Start: 2024-08-17

## 2024-08-17 RX ORDER — OXYTOCIN-SODIUM CHLORIDE 0.9% IV SOLN 30 UNIT/500ML 30-0.9/5 UT/ML-%
10 SOLUTION INTRAVENOUS ONCE AS NEEDED
Status: CANCELLED | OUTPATIENT
Start: 2024-08-17 | End: 2036-01-13

## 2024-08-17 RX ORDER — FENTANYL CITRATE 50 UG/ML
INJECTION, SOLUTION INTRAMUSCULAR; INTRAVENOUS
Status: DISCONTINUED | OUTPATIENT
Start: 2024-08-17 | End: 2024-08-17

## 2024-08-17 RX ORDER — IBUPROFEN 600 MG/1
600 TABLET ORAL EVERY 6 HOURS
Status: DISCONTINUED | OUTPATIENT
Start: 2024-08-17 | End: 2024-08-19 | Stop reason: HOSPADM

## 2024-08-17 RX ORDER — DIPHENHYDRAMINE HCL 25 MG
25 CAPSULE ORAL EVERY 4 HOURS PRN
Status: DISCONTINUED | OUTPATIENT
Start: 2024-08-17 | End: 2024-08-19 | Stop reason: HOSPADM

## 2024-08-17 RX ADMIN — MISOPROSTOL 800 MCG: 200 TABLET ORAL at 01:08

## 2024-08-17 RX ADMIN — PRENATAL VIT W/ FE FUMARATE-FA TAB 27-0.8 MG 1 TABLET: 27-0.8 TAB at 09:08

## 2024-08-17 RX ADMIN — ACETAMINOPHEN 650 MG: 325 TABLET, FILM COATED ORAL at 09:08

## 2024-08-17 RX ADMIN — IBUPROFEN 600 MG: 600 TABLET, FILM COATED ORAL at 05:08

## 2024-08-17 RX ADMIN — IBUPROFEN 600 MG: 600 TABLET, FILM COATED ORAL at 09:08

## 2024-08-17 RX ADMIN — DOCUSATE SODIUM 200 MG: 100 CAPSULE, LIQUID FILLED ORAL at 09:08

## 2024-08-17 RX ADMIN — FENTANYL CITRATE 100 MCG: 0.05 INJECTION, SOLUTION INTRAMUSCULAR; INTRAVENOUS at 01:08

## 2024-08-17 RX ADMIN — ACETAMINOPHEN 650 MG: 325 TABLET, FILM COATED ORAL at 05:08

## 2024-08-17 NOTE — H&P
HISTORY AND PHYSICAL                                                OBSTETRICS        Subjective:       Sheron Frankel is a 24 y.o.  female with IUP at 40w4d weeks gestation who presents with IOL.    Patient denies contractions, denies vaginal bleeding, denies LOF.   Fetal Movement: normal.    This IUP is complicated by language barrier, Hep B carrier.    Review of Systems   Constitutional:  Negative for chills, diaphoresis, fatigue and fever.   HENT:  Negative for nasal congestion.    Respiratory:  Negative for shortness of breath.    Cardiovascular:  Negative for chest pain and leg swelling.   Gastrointestinal:  Negative for abdominal pain, constipation, diarrhea, nausea and vomiting.   Genitourinary:  Negative for dysuria, hematuria, vaginal bleeding and vaginal discharge.   Musculoskeletal:  Negative for back pain.   Neurological:  Negative for headaches.     PMHx:   Past Medical History:   Diagnosis Date    Unspecified viral hepatitis B without hepatic coma      PSHx: No past surgical history on file.    All: Review of patient's allergies indicates:  No Known Allergies    Meds:   Medications Prior to Admission   Medication Sig Dispense Refill Last Dose    acetaminophen (TYLENOL) 500 MG tablet Take 1 tablet (500 mg total) by mouth every 6 (six) hours as needed. 30 tablet 0      SH:   Social History     Socioeconomic History    Marital status:    Tobacco Use    Smoking status: Never     Passive exposure: Never    Smokeless tobacco: Never   Substance and Sexual Activity    Alcohol use: Not Currently    Drug use: Not Currently    Sexual activity: Not Currently     FH: No family history on file.    OBHx:   OB History    Para Term  AB Living   3 2 1 0 0 2   SAB IAB Ectopic Multiple Live Births   0 0 0 0 2      # Outcome Date GA Lbr Marshall/2nd Weight Sex Type Anes PTL Lv   3 Current            2 Term 21 40w0d   M Vag-Spont OTHER  RONY      Name: Enzo Salazar   1 Para 19 40w0d   F  Vag-Spont  N RONY      Name: Rachel Bradley     Objective:       /60   Pulse 70   Temp 98.2 °F (36.8 °C)   Resp 18   LMP 12/15/2023   SpO2 99%   Breastfeeding No     Vitals:    08/16/24 2145 08/16/24 2200 08/16/24 2215 08/16/24 2230   BP: 111/71 112/69 110/65 103/60   Pulse: 75 77 70 70   Resp:       Temp:       SpO2: 100% 99% 100% 99%       General:   alert, appears stated age and cooperative, no apparent distress   HENT:  normocephalic, atraumatic   Eyes:  extraocular movements and conjunctivae normal   Neck:  supple, range of motion normal, no thyromegaly   Lungs:   no respiratory distress   Heart:   regular rate   Abdomen:  soft, non-tender, non-distended but gravid, no rebound or guarding    Extremities negative edema, negative erythema   FHT: 125, moderate BTBV, +accels, -decels;  Cat 1 (reassuring)                 TOCO: irregular   Presentations: cephalic by ultrasound   Cervix:     Dilation: 3cm    Effacement: 75%    Station:  -3    Consistency: medium    Position: middle     EFW by Leopold's: 7#    Recent Growth Scan: 2683 g (83%)    Lab Review  Blood Type A POS  GBBS: negative  Rubella: Immune  Trep: neg (7/9/24); neg (8/16/24)  HIV: negative  HepB: HepB core IgM neg; HepB core Ab reactive; HepB antigen nonreactive; HepB virus quant 665    Assessment:       40w4d weeks gestation with IOL    Active Hospital Problems    Diagnosis  POA    Encounter for induction of labor [Z34.90]  Not Applicable    Hepatitis B carrier [B18.1]  Not Applicable    Late prenatal care affecting pregnancy in second trimester [O09.32]  Not Applicable     Unclear EDC- PNT at 38-39 wga?        Resolved Hospital Problems   No resolved problems to display.     Plan:   IOL   Risks, benefits, alternatives and possible complications have been discussed in detail with the patient.   - Consents signed and to chart  - Admit to Labor and Delivery unit  -  used to review IOL and expectations   - Epidural per  Anesthesia  - Draw CBC, T&S  - Notify Staff  - Recheck in 4 hrs or PRN  - Plan for augmentation with oxytocin; AROM after epidural    2. Hepatitis B Carrier  Per M,  Intrapartum management  Hepatitis B infection is not an indication for elective   Avoid (if possible) or minimize use of early amniotomy, fetal scalp electrode, or operative vaginal delivery  Check CMP on admit  Postpartum  Infants should receive Hepatitis B Immune Globulin (HBIG) in addition to the standard Hepatitis B vaccine series within 12 hours of birth.    Post-Partum Hemorrhage risk - low      Carlos A Scales MD MS  OB/Gyn  PGY-2

## 2024-08-17 NOTE — L&D DELIVERY NOTE
"[facility] - Labor & Delivery  Vaginal Delivery   Operative Note    SUMMARY   D[employer]hi Interpretor ID#[id number] used for encounter    Resident to bedside for patient reports of increasing pain. SVE 10/100/+2.     Normal spontaneous vaginal delivery of live infant, was placed on mothers abdomen for skin to skin and bulb suctioning performed.  Infant delivered position OA over intact perineum.  Nuchal cord: No.    Spontaneous delivery of placenta and IV pitocin given noting uterine atony with bleeding. 800 mcg rectal cytotec given and bimanual massage with improvement in atony and bleeding  No lacerations noted.  Patient tolerated delivery well. Sponge needle and lap counted correctly x2.     cc    Елена Salas MD  PGY-4 OB/GYN        Indications:  (spontaneous vaginal delivery)  Pregnancy complicated by:   Patient Active Problem List   Diagnosis    Supervision of other normal pregnancy, antepartum    Hepatitis B carrier     (spontaneous vaginal delivery)     Admitting GA: 40w5d    Delivery Information for Girl Sheron Frankel    Birth information:  YOB: 2022   Time of birth: 1:37 AM   Sex: female   Head Delivery Date/Time: 2024  1:37 AM   Delivery type: Vaginal, Spontaneous   Gestational Age: 40w5d        Delivery Providers    Delivering clinician: Raiza So MD   Provider Role    Елена Salas MD Resident    Skylar Harris, RN Delivery Nurse    Justyna Mary RN Registered Nurse    Liss Abdullahi RN Charge Nurse    [facility]              Measurements    Weight: 3130 g  Weight (lbs): 6 lb 14.4 oz  Length: 49.5 cm  Length (in): 19.5"  Head circumference: 34.5 cm  Chest circumference: 33 cm         Apgars    Living status: Living  Apgar Component Scores:  1 min.:  5 min.:  10 min.:  15 min.:  20 min.:    Skin color:  0  1       Heart rate:  2  2       Reflex irritability:  2  2       Muscle tone:  2  2       Respiratory effort:  2  2       Total:  " 8  9       Apgars assigned by: TRES MARTIN RN         Operative Delivery    Forceps attempted?: No  Vacuum extractor attempted?: No         Shoulder Dystocia    Shoulder dystocia present?: No           Presentation    Presentation: Vertex  Position: Occiput Anterior           Interventions/Resuscitation    Method: Bulb Suctioning, Tactile Stimulation       Cord    Vessels: 3 vessels  Complications: None  Delayed Cord Clamping?: Yes  Cord Clamped Date/Time: 2024  1:38 AM  Cord Blood Disposition: Discarded  Gases Sent?: No  Stem Cell Collection (by MD): No       Placenta    Placenta delivery date/time: 2024 0140  Placenta removal: Spontaneous  Placenta appearance: Intact  Placenta disposition: Discarded           Labor Events:       labor: No     Labor Onset Date/Time:         Dilation Complete Date/Time: 2024 01:25     Start Pushing Date/Time:         Start Pushing Date/Time:       Rupture Date/Time: 24         Rupture type: SRM (Spontaneous Rupture)         Fluid Amount:       Fluid Color: Clear               steroids: None     Antibiotics given for GBS: No     Induction:       Indications for induction:        Augmentation: oxytocin     Indications for augmentation: Ineffective Contraction Pattern     Labor complications: None     Additional complications:          Cervical ripening:                     Delivery:      Episiotomy: None     Indication for Episiotomy:       Perineal Lacerations: None Repaired:      Periurethral Laceration:   Repaired:     Labial Laceration:   Repaired:     Sulcus Laceration:   Repaired:     Vaginal Laceration:   Repaired:     Cervical Laceration:   Repaired:     Repair suture: None     Repair # of packets: 0     Last Value - EBL - Nursing (mL):       Sum - EBL - Nursing (mL): 0     Last Value - EBL - Anesthesia (mL):      Calculated QBL (mL): 450      Running total QBL (mL): 450      Vaginal Sweep Performed: No     Surgicount Correct: Yes      Vaginal Packing: No Quantity:       Other providers:       Anesthesia    Method: Epidural          Details (if applicable):  Trial of Labor      Categorization:      Priority:     Indications for :     Incision Type:       Additional  information:  Forceps:    Vacuum:    Breech:    Observed anomalies    Other (Comments):

## 2024-08-17 NOTE — ANESTHESIA PROCEDURE NOTES
Epidural    Patient location during procedure: OB   Reason for block: primary anesthetic   Reason for block: labor analgesia requested by patient and obstetrician  Diagnosis: IUP   Start time: 8/16/2024 9:45 PM  Timeout: 8/16/2024 9:45 PM  End time: 8/16/2024 10:05 PM  Surgery related to: Vaginal Delivery    Staffing  Performing Provider: Kenji Brito DO  Authorizing Provider: Bacilio Alvarado MD    Staffing  Performed by: Kenji Brito DO  Authorized by: Bacilio Alvarado MD        Preanesthetic Checklist  Completed: patient identified, IV checked, site marked, risks and benefits discussed, surgical consent, monitors and equipment checked, pre-op evaluation, timeout performed, anesthesia consent given, hand hygiene performed and patient being monitored  Preparation  Patient position: sitting  Prep: ChloraPrep  Patient monitoring: Pulse Ox  Reason for block: primary anesthetic   Epidural  Skin Anesthetic: lidocaine 1%  Skin Wheal: 3 mL  Administration type: continuous  Approach: midline  Interspace: L3-4    Injection technique: RICKI saline  Needle and Epidural Catheter  Needle type: Tuohy   Needle gauge: 17  Needle length: 3.5 inches  Needle insertion depth: 6 cm  Catheter type: Gient  Catheter size: 19 G  Catheter at skin depth: 10 cm  Insertion Attempts: 2  Test dose: 3 mL of lidocaine 1.5% with Epi 1-to-200,000  Additional Documentation: incremental injection, negative aspiration for heme and CSF, no paresthesia on injection, no signs/symptoms of IV or SA injection, no significant pain on injection and no significant complaints from patient  Needle localization: anatomical landmarks  Medications:  Volume per aspiration: 5 mL  Time between aspirations: 5 minutes   Assessment  Ease of block: easy  Patient's tolerance of the procedure: comfortable throughout block and no complaints No inadvertent dural puncture with Tuohy.  Dural puncture performed with spinal needle.

## 2024-08-17 NOTE — PROGRESS NOTES
LABOR NOTE    S:  MD to bedside for routine cervical exam. Epidural working:  yes  Pt reports feeling pressure    O: /70   Pulse 71   Temp 98.2 °F (36.8 °C)   Resp 18   LMP 12/15/2023   SpO2 100%   Breastfeeding No     FHT: 125 bpm, moderate variability, +accels, + variable decels, Cat 2 (generally reassuring)  CTX: q 2 minutes, pit @ 4  SVE: 4/70/-2    TIMELINE:  0000: 4/70-2    ASSESSMENT:  Final Active Diagnoses:    Diagnosis Date Noted POA    Encounter for induction of labor [Z34.90] 08/16/2024 Not Applicable    Hepatitis B carrier [B18.1] 06/02/2024 Not Applicable    Late prenatal care affecting pregnancy in second trimester [O09.32] 05/24/2024 Not Applicable      Problems Resolved During this Admission:     PLAN:    Continue Close Maternal/Fetal Monitoring  Pitocin Augmentation per protocol  Recheck 4 hours or PRN  Avoid amniotomy 2/2 HepB      Carlos A Scales MD MS  OB/Gyn  PGY-2

## 2024-08-17 NOTE — PLAN OF CARE
Pt ambulating and voiding without difficulty. Patient safety maintained, side rails up, bed low and locked position.  Pain well controlled with PRN pain medication. Fundus midline, firm, with moderate lochia. VSS. Pt responding to infant cues and bonding appropriately. Will continue to monitor.     Problem: Adult Inpatient Plan of Care  Goal: Plan of Care Review  Outcome: Progressing  Goal: Patient-Specific Goal (Individualized)  Outcome: Progressing  Goal: Absence of Hospital-Acquired Illness or Injury  Outcome: Progressing  Goal: Optimal Comfort and Wellbeing  Outcome: Progressing  Goal: Readiness for Transition of Care  Outcome: Progressing     Problem:  Fall Injury Risk  Goal: Absence of Fall, Infant Drop and Related Injury  Outcome: Progressing     Problem: Infection  Goal: Absence of Infection Signs and Symptoms  Outcome: Progressing     Problem: Postpartum (Vaginal Delivery)  Goal: Successful Parent Role Transition  Outcome: Progressing  Goal: Hemostasis  Outcome: Progressing  Goal: Absence of Infection Signs and Symptoms  Outcome: Progressing  Goal: Anesthesia/Sedation Recovery  Outcome: Progressing  Goal: Optimal Pain Control and Function  Outcome: Progressing  Goal: Effective Urinary Elimination  Outcome: Progressing

## 2024-08-18 PROBLEM — D62 ANEMIA DUE TO BLOOD LOSS, ACUTE: Status: ACTIVE | Noted: 2024-08-18

## 2024-08-18 LAB
BASOPHILS # BLD AUTO: 0.03 K/UL (ref 0–0.2)
BASOPHILS NFR BLD: 0.3 % (ref 0–1.9)
DIFFERENTIAL METHOD BLD: ABNORMAL
EOSINOPHIL # BLD AUTO: 0.1 K/UL (ref 0–0.5)
EOSINOPHIL NFR BLD: 0.6 % (ref 0–8)
ERYTHROCYTE [DISTWIDTH] IN BLOOD BY AUTOMATED COUNT: 13 % (ref 11.5–14.5)
HCT VFR BLD AUTO: 26.6 % (ref 37–48.5)
HGB BLD-MCNC: 8.5 G/DL (ref 12–16)
IMM GRANULOCYTES # BLD AUTO: 0.11 K/UL (ref 0–0.04)
IMM GRANULOCYTES NFR BLD AUTO: 1.2 % (ref 0–0.5)
LYMPHOCYTES # BLD AUTO: 2.2 K/UL (ref 1–4.8)
LYMPHOCYTES NFR BLD: 24.7 % (ref 18–48)
MCH RBC QN AUTO: 28.9 PG (ref 27–31)
MCHC RBC AUTO-ENTMCNC: 32 G/DL (ref 32–36)
MCV RBC AUTO: 91 FL (ref 82–98)
MONOCYTES # BLD AUTO: 0.5 K/UL (ref 0.3–1)
MONOCYTES NFR BLD: 5.8 % (ref 4–15)
NEUTROPHILS # BLD AUTO: 6.1 K/UL (ref 1.8–7.7)
NEUTROPHILS NFR BLD: 67.4 % (ref 38–73)
NRBC BLD-RTO: 0 /100 WBC
PLATELET # BLD AUTO: 242 K/UL (ref 150–450)
PMV BLD AUTO: 10.1 FL (ref 9.2–12.9)
RBC # BLD AUTO: 2.94 M/UL (ref 4–5.4)
WBC # BLD AUTO: 9.03 K/UL (ref 3.9–12.7)

## 2024-08-18 PROCEDURE — 11000001 HC ACUTE MED/SURG PRIVATE ROOM

## 2024-08-18 PROCEDURE — 85025 COMPLETE CBC W/AUTO DIFF WBC: CPT | Performed by: OBSTETRICS & GYNECOLOGY

## 2024-08-18 PROCEDURE — 25000003 PHARM REV CODE 250

## 2024-08-18 PROCEDURE — 36415 COLL VENOUS BLD VENIPUNCTURE: CPT | Performed by: OBSTETRICS & GYNECOLOGY

## 2024-08-18 PROCEDURE — 99233 SBSQ HOSP IP/OBS HIGH 50: CPT | Mod: UC,,, | Performed by: ADVANCED PRACTICE MIDWIFE

## 2024-08-18 RX ADMIN — PRENATAL VIT W/ FE FUMARATE-FA TAB 27-0.8 MG 1 TABLET: 27-0.8 TAB at 08:08

## 2024-08-18 RX ADMIN — DOCUSATE SODIUM 200 MG: 100 CAPSULE, LIQUID FILLED ORAL at 08:08

## 2024-08-18 NOTE — ASSESSMENT & PLAN NOTE
2024 PPD #1   24year old G32 now  s/p . No lacerations. QBL 450cc. Doing well, ambulating, voiding, and tolerating regular diet. Lochia: steadily decreasing.  Postpartum anemia: H&H on admission 30.4 today's result not in yet. VSS, normotensive and afebrile. Breastfeeding infant w/o difficulty and giving formula comp. PO pain meds managing postpartum discomforts. Has not had BM since birth. Normal involution. Depression/anxiety: no hx or s/s. Support at home: partner. Rh +, [place]. . Desired method of contraception: undecided; understands that progesterone only options are appropriate with breastfeeding. No immunization history in ct. Bonding well w/ baby, who is doing well; will FU w/peds provider. Plan D/C tomorrow. Visit completed via use of translation jorgito.

## 2024-08-18 NOTE — ANESTHESIA POSTPROCEDURE EVALUATION
Anesthesia Post Evaluation    Patient: Sheron Frankel    Procedure(s) Performed: * No procedures listed *    Final Anesthesia Type: epidural      Patient location during evaluation: labor & delivery  Patient participation: Yes- Able to Participate  Level of consciousness: awake and alert  Post-procedure vital signs: reviewed and stable  Pain management: adequate  Airway patency: patent  MARA mitigation strategies: Multimodal analgesia  PONV status at discharge: No PONV  Anesthetic complications: no      Cardiovascular status: blood pressure returned to baseline, hemodynamically stable and stable  Respiratory status: unassisted, room air and spontaneous ventilation  Hydration status: euvolemic  Follow-up not needed.              Vitals Value Taken Time   BP 98/53 08/18/24 0851   Temp 36.6 °C (97.9 °F) 08/18/24 0851   Pulse 72 08/18/24 0851   Resp 17 08/18/24 0851   SpO2 100 % 08/18/24 0851         No case tracking events are documented in the log.      Pain/Harpreet Score: Pain Rating Prior to Med Admin: 1 (8/17/2024  5:25 PM)

## 2024-08-18 NOTE — PROGRESS NOTES
[facility]  Obstetrics  Postpartum Progress Note    Patient Name: Sheron Frankel  MRN: [id number]  Admission Date: 2024  Hospital Length of Stay: 2 days  Attending Physician: Fidelia Rangel MD  Primary Care Provider: Madeline, Primary Doctor    Subjective:     Principal Problem: (spontaneous vaginal delivery)    Hospital Course:  24year old G3 now  s/p . No lacerations. QBL 450cc. Doing well, ambulating, voiding, and tolerating regular diet. Lochia: steadily decreasing.  Postpartum anemia: H&H on admission 30.4 today's result not in yet. VSS, normotensive and afebrile. Breastfeeding infant w/o difficulty and giving formula comp. PO pain meds managing postpartum discomforts. Has not had BM since birth. Normal involution. Depression/anxiety: no hx or s/s. Support at home: partner. Rh +, RI. . Desired method of contraception: undecided; understands that progesterone only options are appropriate with breastfeeding. No immunization history in ct. Bonding well w/ baby, who is doing well; will FU w/peds provider. Plan D/C tomorrow. Visit completed via use of translation jorgito.     Interval history:   24year old G3 now  s/p . No lacerations. QBL 450cc. Doing well, ambulating, voiding, and tolerating regular diet. Lochia: steadily decreasing.  Postpartum anemia: H&H on admission 30.4 today's result not in yet. VSS, normotensive and afebrile. Breastfeeding infant w/o difficulty and giving formula comp. PO pain meds managing postpartum discomforts. Has not had BM since birth. Normal involution. Depression/anxiety: no hx or s/s. Support at home: partner. Rh +, RI. . Desired method of contraception: undecided; understands that progesterone only options are appropriate with breastfeeding. No immunization history in ct. Bonding well w/ baby, who is doing well; will FU w/peds provider. Plan D/C tomorrow. Visit completed via use of translation jorgito.   Assessment/Plan:     24 y.o.  female  for:    *  (spontaneous vaginal delivery)  2024 PPD #1   24year old G32 now  s/p . No lacerations. QBL 450cc. Doing well, ambulating, voiding, and tolerating regular diet. Lochia: steadily decreasing.  Postpartum anemia: H&H on admission 30.4 today's result not in yet. VSS, normotensive and afebrile. Breastfeeding infant w/o difficulty and giving formula comp. PO pain meds managing postpartum discomforts. Has not had BM since birth. Normal involution. Depression/anxiety: no hx or s/s. Support at home: partner. Rh +, RI. . Desired method of contraception: undecided; understands that progesterone only options are appropriate with breastfeeding. No immunization history in ct. Bonding well w/ baby, who is doing well; will FU w/peds provider. Plan D/C tomorrow. Visit completed via use of translation jorgito.         Disposition: As patient meets milestones, will plan to discharge tomorrow.    Sherrie Mendoza CNM  Obstetrics  [facility])

## 2024-08-18 NOTE — SUBJECTIVE & OBJECTIVE
Interval History:   2024 PPD #1   24year old G32 now  s/p . No lacerations. QBL 450cc. Doing well, ambulating, voiding, and tolerating regular diet. Lochia: steadily decreasing.  Postpartum anemia: H&H on admission 30.4 today's result not in yet. VSS, normotensive and afebrile. Breastfeeding infant w/o difficulty and giving formula comp. PO pain meds managing postpartum discomforts. Has not had BM since birth. Normal involution. Depression/anxiety: no hx or s/s. Support at home: partner. Rh +, RI. . Desired method of contraception: undecided; understands that progesterone only options are appropriate with breastfeeding. No immunization history in ct. Bonding well w/ baby, who is doing well; will FU w/peds provider. Plan D/C tomorrow. Visit completed via use of translation jorgito.     Objective:     Vital Signs (Most Recent):  Temp: 97.9 °F (36.6 °C) (24 0851)  Pulse: 72 (24 0851)  Resp: 17 (24 0851)  BP: (!) 98/53 (24 0851)  SpO2: 100 % (24 0851) Vital Signs (24h Range):  Temp:  [97.9 °F (36.6 °C)-98.5 °F (36.9 °C)] 97.9 °F (36.6 °C)  Pulse:  [60-75] 72  Resp:  [17-18] 17  SpO2:  [100 %] 100 %  BP: ()/(51-66) 98/53        There is no height or weight on file to calculate BMI.      Intake/Output Summary (Last 24 hours) at 2024 1200  Last data filed at 2024 1500  Gross per 24 hour   Intake --   Output 1400 ml   Net -1400 ml         Significant Labs:  Lab Results   Component Value Date    GROUPTRH A POS 2024    HEPBSAG Reactive (A) 06/10/2024    STREPBCULT No Group B Streptococcus isolated 2024     Recent Labs   Lab 24   HGB 9.7*   HCT 30.4*       I have personallly reviewed all pertinent lab results from the last 24 hours.    Physical Exam      Gen: A&O x 4, NAD  CV: normal HR  Lungs: normal resp effort  Breasts: bilaterally soft, non-tender, nipples intact without breakdown  Abdomen: soft, non-tender, uterus firm at U - 1  fb  Diastasis Recti: 3  FB  Perineum: intact, no edema or ecchymosis   Lochia: minimal rubra  Ext: bilaterally no pedal edema without signs of DVT    Review of Systems

## 2024-08-18 NOTE — ED NOTES
CNM Low Risk  PPD#1 s/p    H/H: 9.7/30.4 >  mL > p  **Speaks Narendra**  Dx: Hep B carrier, anemia, s/p cytotec, no lac  BC: ASK  Meds: ibuprofen, norco, PP Lovenox-No  CMP WNL  FEMALE [o] SW consult

## 2024-08-18 NOTE — PLAN OF CARE
VSS. Uterus firm w/o massage. Bleeding continues to improve. Pt ambulating independently. Voiding spontaneously, passing flatus. Pain managed adequately w/ medication. No new concerns expressed at this time. D/C teaching completed. Will continue to monitor and intervene as necessary.

## 2024-08-18 NOTE — HOSPITAL COURSE
2024 PPD #1   24year old G3 now  s/p . No lacerations. QBL 450cc. Doing well, ambulating, voiding, and tolerating regular diet. Lochia: steadily decreasing.  Postpartum anemia: H&H on admission 30.4 today's result not in yet. VSS, normotensive and afebrile. Breastfeeding infant w/o difficulty and giving formula comp. PO pain meds managing postpartum discomforts. Has not had BM since birth. Normal involution. Depression/anxiety: no hx or s/s. Support at home: partner. Rh +, RI. . Desired method of contraception: undecided; understands that progesterone only options are appropriate with breastfeeding. No immunization history in ct. Bonding well w/ baby, who is doing well; will FU w/peds provider. Plan D/C tomorrow. Visit completed via use of translation jorgito.

## 2024-08-18 NOTE — PLAN OF CARE
Pt ambulating and voiding without difficulty. Patient safety maintained, side rails up, bed low and locked position.  Decline pain meds. Fundus midline, firm, with light lochia. VSS. Pt responding to infant cues and bonding appropriately. Will continue to monitor.     Problem: Adult Inpatient Plan of Care  Goal: Plan of Care Review  Outcome: Progressing  Goal: Patient-Specific Goal (Individualized)  Outcome: Progressing  Goal: Absence of Hospital-Acquired Illness or Injury  Outcome: Progressing  Goal: Optimal Comfort and Wellbeing  Outcome: Progressing  Goal: Readiness for Transition of Care  Outcome: Progressing     Problem:  Fall Injury Risk  Goal: Absence of Fall, Infant Drop and Related Injury  Outcome: Progressing     Problem: Infection  Goal: Absence of Infection Signs and Symptoms  Outcome: Progressing     Problem: Postpartum (Vaginal Delivery)  Goal: Successful Parent Role Transition  Outcome: Progressing  Goal: Hemostasis  Outcome: Progressing  Goal: Absence of Infection Signs and Symptoms  Outcome: Progressing  Goal: Anesthesia/Sedation Recovery  Outcome: Progressing  Goal: Optimal Pain Control and Function  Outcome: Progressing  Goal: Effective Urinary Elimination  Outcome: Progressing

## 2024-08-19 RX ORDER — ACETAMINOPHEN 325 MG/1
650 TABLET ORAL EVERY 6 HOURS
Qty: 30 TABLET | Refills: 1 | Status: SHIPPED | OUTPATIENT
Start: 2024-08-19

## 2024-08-19 RX ORDER — FERROUS SULFATE 325(65) MG
325 TABLET ORAL
Qty: 30 TABLET | Refills: 1 | Status: SHIPPED | OUTPATIENT
Start: 2024-08-19

## 2024-08-19 RX ORDER — DOCUSATE SODIUM 100 MG/1
100 CAPSULE, LIQUID FILLED ORAL 2 TIMES DAILY
Qty: 30 CAPSULE | Refills: 1 | Status: SHIPPED | OUTPATIENT
Start: 2024-08-19

## 2024-08-19 RX ORDER — IBUPROFEN 600 MG/1
600 TABLET ORAL EVERY 6 HOURS
Qty: 30 TABLET | Refills: 1 | Status: SHIPPED | OUTPATIENT
Start: 2024-08-19

## 2024-08-19 NOTE — NURSING
#[id number].  Patient discharge instructions reviewed with and given to patient and spouse. Patient and spouse verbalized understanding. Resource phone numbers for WIC and all discharge instructions given in printed [place].

## 2024-08-19 NOTE — PLAN OF CARE
"SW consulted to see pt: limited finances and other resources.  Bj reviewed pt's chart and met with pt at bedside. Pt was alert, and oriented. Pt is already known to BJ. Assessment completed using the language line, interpretor name is Trisha, and ID number is [id number]. BJ confirmed with pt, she does not have the essentials to care for baby. Pt reports she does not have a car seat, cloths, diapers, or anything at home to care for baby. BJ explained several times, BJ, has provided her all the resources in the past to plan for baby. Pt reports, SW, only provided her one number, however, pt did not use the number she reports she received from SW. The number pt was referring to is the number for [facility].     BJ reinforced, during her very first visit with BJ located in clinic, BJ, provided pt with the information to WIC, Healthy Start, SNAP, and FITAP.  BJ placed a referral to [facility] and using a interpretor explained, [facility] would be able to connect her to all the resources she need to prepare for baby.     [facility] rep contacted BJ to verify pt's language. BJ used language line and contacted pt on 3 way so she would be able to speak with the reps at [facility]. Pt reports she received things in the mail for appointments but she never received a phone call. BJ encouraged pt to reach out to [facility] and explained she should call with someone that would be able to interpret for her. It appears no phone calls was ever made and pt is not prepared for baby. BJ explained, [facility] is not prepared to care for baby. BJ asked pt if she understood what DCFS is and pt stated, "no". SW explained and pt asked, "will they take my baby". BJ explained, DCFS will investigate and make final decisions.     As a mandated , BJ contacted [facility] Child Abuse and Neglect Hotline at [phone] regarding pt's wellbeing. BJ completed   report and the intake number is: " [id number]. RHIANNON also completed the online reporting form as follow up.  Should the case be accepted, [facility]HI[facility] will [facility] once assigned.         Name: Sheron Frankel :  2000    Patient Active Problem List   Diagnosis    Supervision of other normal pregnancy, antepartum    Hepatitis B carrier     (spontaneous vaginal delivery)    Breast feeding status of mother    Anemia due to blood loss, acute        24 1304   OB Discharge Planning Assessment   Assessment Type Discharge Planning Assessment   Source of Information patient   Insurance Medicaid   Medicaid   ([employer])   People in Home significant other;child(anant), dependent   Relationship Status In relationship   Name of Support/Comfort Primary Source Martin Awan, pt's signicant other, [phone]   Other children (include names and ages) One other child reported. Baby is 4 years old and will be 3 in December.   Employed No   Currently Enrolled in School No   Father's Involvement Fully Involved   Is Father signing the birth certificate Yes   Received Prenatal Care Yes, Late   Transportation Anticipated public transportation  (Pt has been provided with Medicaid transportation information.)   Receive [facility] Benefits   (Information provided but pt never applied.)   Adoption Planned no   Infant Feeding Plan formula feeding   Do you have a car seat? No   Provided resources to obtain   (Resources provided but never used.)   Has other essential care items?   (According to pt, she does not have essentials to care for baby.)   Equipment Currently Used at Home none   [facility]   [facility] number [id number]   Discharge Plan A Home with family   Do you have any problems affording any of your prescribed medications? TBD

## 2024-08-19 NOTE — PLAN OF CARE
VSS overnight. Patient denies HA, dizziness, vision changes, and RUQ pain. Pt ambulating independently and voiding without difficulty. Patient safety maintained, side rails up, bed low and locked position. Pain well controlled with no pain medication. Fundus midline and firm with light lochia. Patient responding to infant cues and bonding appropriately. No additional needs at this time.        Problem: Adult Inpatient Plan of Care  Goal: Plan of Care Review  Outcome: Progressing  Goal: Patient-Specific Goal (Individualized)  Outcome: Progressing  Goal: Absence of Hospital-Acquired Illness or Injury  Outcome: Progressing  Goal: Optimal Comfort and Wellbeing  Outcome: Progressing  Goal: Readiness for Transition of Care  Outcome: Progressing     Problem:  Fall Injury Risk  Goal: Absence of Fall, Infant Drop and Related Injury  Outcome: Progressing     Problem: Infection  Goal: Absence of Infection Signs and Symptoms  Outcome: Progressing     Problem: Postpartum (Vaginal Delivery)  Goal: Successful Parent Role Transition  Outcome: Progressing  Goal: Hemostasis  Outcome: Progressing  Goal: Absence of Infection Signs and Symptoms  Outcome: Progressing  Goal: Anesthesia/Sedation Recovery  Outcome: Progressing  Goal: Optimal Pain Control and Function  Outcome: Progressing  Goal: Effective Urinary Elimination  Outcome: Progressing     Problem: Breastfeeding  Goal: Effective Breastfeeding  Outcome: Progressing

## 2024-08-19 NOTE — DISCHARGE SUMMARY
Delivery Discharge Summary  Obstetrics    Primary OB Clinician: Fidelia Rangel MD      Admission date: 2024  Discharge date: 2024    Disposition: To home, self care    Discharge Diagnosis List:      Patient Active Problem List   Diagnosis    Supervision of other normal pregnancy, antepartum    Hepatitis B carrier     (spontaneous vaginal delivery)    Breast feeding status of mother    Anemia due to blood loss, acute     Procedure:     Hospital Course:  Sheron Frankel is a 24 y.o. now , PPD #2 who was admitted on 2024 at 40w5d for IOL. Patient was subsequently admitted to labor and delivery unit with signed consents.     Labor course was uncomplicated and resulted in  without complications.     Please see delivery note for further details. Her postpartum course was complicated by significant social concerns regarding ability to care for .  Social work saw patient on multiple occasions, and was apprised of limitations at home warranting a report to DCFS. On discharge day, patient's pain is controlled with oral pain medications. Pt is tolerating ambulation without SOB or CP, and regular diet without N/V. Reports lochia is mild.  Discussion using language line  explained that while mother is medically ready for discharge, there are social concerns limiting discharge of the .  Plan was made for mother and child to room in on the Mother Baby Unit on the night of discharge, so that they remain together and available for interview with DCFS.  Both patient and partner voiced understanding of plan.    Pt in stable condition and ready for discharge. She has been instructed to start and/or continue medications and follow up with her obstetrics provider as listed below.    Temp:  [97.8 °F (36.6 °C)-97.9 °F (36.6 °C)] 97.8 °F (36.6 °C)  Pulse:  [73-89] 84  Resp:  [17-18] 18  SpO2:  [97 %-100 %] 97 %  BP: ()/(51-58) 98/55    Abdomen: Soft, appropriately tender  "  Uterus: Firm, no fundal tenderness   Incision: N/A     Pertinent studies:  CBC  Recent Labs   Lab 24  1953 24  1222   WBC 9.39 9.03   HGB 9.7* 8.5*   HCT 30.4* 26.6*   MCV 90 91    242      There is no immunization history for the selected administration types on file for this patient.     Delivery:    Episiotomy: None   Lacerations: None   Repair suture: None   Repair # of packets: 0   Blood loss (ml):       Birth information:  YOB: 2022   Time of birth: 1:37 AM   Sex: female   Delivery type: Vaginal, Spontaneous   Gestational Age: 40w5d     Measurements    Weight: 3130 g  Weight (lbs): 6 lb 14.4 oz  Length: 49.5 cm  Length (in): 19.5"  Head circumference: 34.5 cm  Chest circumference: 33 cm         Delivery Clinician: Delivery Providers    Delivering clinician: Raiza So MD   Provider Role    Елена Salas MD Resident    Harris, Skylar, RN Delivery Nurse    Justyna Martin RN Registered Nurse    Liss Abdullahi RN Charge Nurse    [facility]             Additional  information:  Forceps:    Vacuum:    Breech:    Observed anomalies      Living?:     Apgars    Living status: Living  Apgar Component Scores:  1 min.:  5 min.:  10 min.:  15 min.:  20 min.:    Skin color:  0  1       Heart rate:  2  2       Reflex irritability:  2  2       Muscle tone:  2  2       Respiratory effort:  2  2       Total:  8  9       Apgars assigned by: TRES MARTIN RN         Placenta: Delivered:       appearance    Patient Instructions:   Current Discharge Medication List        START taking these medications    Details   docusate sodium (COLACE) 100 MG capsule Take 1 capsule (100 mg total) by mouth 2 (two) times daily.  Qty: 30 capsule, Refills: 1      ferrous sulfate (IRON) 325 mg (65 mg iron) Tab tablet Take 1 tablet (325 mg total) by mouth daily with breakfast.  Qty: 30 tablet, Refills: 1      ibuprofen (ADVIL,MOTRIN) 600 MG tablet Take 1 tablet (600 mg " total) by mouth every 6 (six) hours.  Qty: 30 tablet, Refills: 1           CONTINUE these medications which have CHANGED    Details   acetaminophen (TYLENOL) 325 MG tablet Take 2 tablets (650 mg total) by mouth every 6 (six) hours.  Qty: 30 tablet, Refills: 1             Discharge Procedure Orders   BREAST PUMP FOR HOME USE     Order Specific Question Answer Comments   Type of pump: Electric    Weight: 157    Length of need (1-99 months): 48      Diet Adult Regular     Lifting restrictions   Order Comments: No lifting anything larger than baby for 6 weeks     No driving until:   Order Comments: No driving while taking narcotics     Pelvic Rest   Order Comments: Until seen in clinic     Notify your health care provider if you experience any of the following:  temperature >100.4     Notify your health care provider if you experience any of the following:  persistent nausea and vomiting or diarrhea     Notify your health care provider if you experience any of the following:  severe uncontrolled pain     Notify your health care provider if you experience any of the following:  redness, tenderness, or signs of infection (pain, swelling, redness, odor or green/yellow discharge around incision site)     Notify your health care provider if you experience any of the following:  difficulty breathing or increased cough     Notify your health care provider if you experience any of the following:  severe persistent headache     Notify your health care provider if you experience any of the following:  persistent dizziness, light-headedness, or visual disturbances     Activity as tolerated        Follow-up Information       Fidelia Rangel MD Follow up in 6 week(s).    Specialties: Obstetrics and Gynecology, Obstetrics  Why: Post partum f/u appt  Contact information:  [address] [address]  [phone]                              Carlos A Scales MD MS  OB/Gyn  PGY-2

## 2024-08-19 NOTE — LACTATION NOTE
08/18/24 1135   Maternal Assessment   Breast Shape Bilateral:;pendulous   Breast Density Bilateral:;soft   Areola Bilateral:;elastic   Nipples Bilateral:;everted   Maternal Infant Feeding   Maternal Emotional State assist needed   Infant Positioning cradle;other (see comments)  (modified)   Signs of Milk Transfer infant jaw motion present;audible swallow   Pain with Feeding no   Nipple Shape After Feeding, Left round   Latch Assistance yes     Pt standing in [address] with baby in her arms. LC able to use non-verbal communication to assist Pt back into room. Utilizing language line, NATALIE able to obtain assistance from  [facility] #[id number] to explain why LC ushered Pt back into room. Pt explained that her baby is hungry and she wants to know if she can breastfeed, because the hole in the bottle nipple is to small. LC requested clarification. Pt explained that her children have to receive Hep B vaccine prior to them being permitted to breastfeed. LC contacted nurse. Nurse came into the room leading LC to ask for clarification. Utilizing the , the nurse informed Pt that baby received Hep B vaccination and Pt may breast feed. Pt agreeable to receiving assistance from . Pt placing baby in modified cradle position supporting baby with knees. Baby sitting on tip of nipple. LC offered to assist with obtaining deeper latch. LC reposition Pt and baby. LC provided max assistance to hold baby in position. A couple of swallows noted. LC reinforced the importance of maintaining baby in position; however, once LC released hold, Pt reverted back to her desired position.  provided Pt basic breastfeeding education. LC reviewed images of feeding cues and encouraged Pt to supplement after breastfeeding if baby is showing cues.NATALIE acknowledged that Pt speaks woolof, but questioned if Pt is able to read or understand any other language. Pt explained that she is able to understand some Monegasque. LC asked if Pt would  like breastfeeding information in Indonesian. Pt agreeable. Printed breastfeeding education provided in Indonesian. Pt denied having any questions at this time. LC placed number on board.

## 2024-08-20 VITALS
SYSTOLIC BLOOD PRESSURE: 100 MMHG | OXYGEN SATURATION: 100 % | TEMPERATURE: 99 F | DIASTOLIC BLOOD PRESSURE: 53 MMHG | RESPIRATION RATE: 17 BRPM | HEART RATE: 84 BPM

## 2024-09-09 ENCOUNTER — HOSPITAL ENCOUNTER (EMERGENCY)
Facility: OTHER | Age: 24
Discharge: HOME | End: 2024-09-09
Attending: OBSTETRICS & GYNECOLOGY
Payer: MEDICAID

## 2024-09-09 VITALS
OXYGEN SATURATION: 100 % | RESPIRATION RATE: 20 BRPM | TEMPERATURE: 99 F | SYSTOLIC BLOOD PRESSURE: 113 MMHG | DIASTOLIC BLOOD PRESSURE: 61 MMHG | HEART RATE: 77 BPM

## 2024-09-09 DIAGNOSIS — M54.9 BACK PAIN, UNSPECIFIED BACK LOCATION, UNSPECIFIED BACK PAIN LATERALITY, UNSPECIFIED CHRONICITY: Primary | ICD-10-CM

## 2024-09-09 PROCEDURE — 99283 EMERGENCY DEPT VISIT LOW MDM: CPT | Mod: ,,, | Performed by: OBSTETRICS & GYNECOLOGY

## 2024-09-09 PROCEDURE — 25000003 PHARM REV CODE 250

## 2024-09-09 PROCEDURE — 99284 EMERGENCY DEPT VISIT MOD MDM: CPT

## 2024-09-09 PROCEDURE — 25000003 PHARM REV CODE 250: Performed by: OBSTETRICS & GYNECOLOGY

## 2024-09-09 RX ORDER — LIDOCAINE 50 MG/G
1 PATCH TOPICAL ONCE
Status: DISCONTINUED | OUTPATIENT
Start: 2024-09-09 | End: 2024-09-09 | Stop reason: HOSPADM

## 2024-09-09 RX ORDER — BUTALBITAL, ACETAMINOPHEN AND CAFFEINE 50; 325; 40 MG/1; MG/1; MG/1
1 TABLET ORAL EVERY 4 HOURS PRN
Status: DISCONTINUED | OUTPATIENT
Start: 2024-09-09 | End: 2024-09-09 | Stop reason: HOSPADM

## 2024-09-09 RX ORDER — BUTALBITAL, ACETAMINOPHEN AND CAFFEINE 300; 40; 50 MG/1; MG/1; MG/1
1 CAPSULE ORAL EVERY 4 HOURS PRN
Qty: 30 CAPSULE | Refills: 0 | Status: SHIPPED | OUTPATIENT
Start: 2024-09-09 | End: 2024-09-16

## 2024-09-09 RX ORDER — LIDOCAINE 50 MG/G
1 PATCH TOPICAL DAILY
Qty: 7 PATCH | Refills: 0 | Status: SHIPPED | OUTPATIENT
Start: 2024-09-09 | End: 2024-09-16

## 2024-09-09 RX ADMIN — LIDOCAINE 1 PATCH: 50 PATCH CUTANEOUS at 03:09

## 2024-09-09 RX ADMIN — BUTALBITAL, ACETAMINOPHEN, AND CAFFEINE 1 TABLET: 50; 325; 40 TABLET ORAL at 02:09

## 2024-09-09 NOTE — DISCHARGE INSTRUCTIONS
Please return to [facility] for the following:     - blood pressure persistent 140/90   - h/a, pain under the right breast, short of breath  - vaginal bleeding - soaking 1 pad per hour for 2 consecutive hours   - flu-like symptoms - chills, fever, diarrhea   - one-sided breast tenderness, warm to touch     Labor and Delivery: [phone] (option 1) for any questions or concerns if after hours from Clinic.

## 2024-09-09 NOTE — ED PROVIDER NOTES
Encounter Date: 2024       History     Chief Complaint   Patient presents with    Back Pain     Back pain that started 2 days ago. Gave birth  and believes the pain is from epidural. Also reports headache. Reports vaginal bleeding since delivery. VSSYamil Frankel is a 24 y.o.  PPD#28 s/p  presents complaining of back pain, headache, vaginal bleeding.     Patient states that she has had low back pain and headache since delivery. She reports that the headache is worse when sitting up or standing. She denies visual changes.  Additionally, she states that she has back pain is located where her epidural was.    She has been having vaginal bleeding since delivery. She reports this is decreasing and she uses 1 pad per day. She denies passing any clots.   She denies CP, palpitations, and SOB.     An  service was used to obtain patient history.           Review of patient's allergies indicates:  No Known Allergies  No past medical history on file.  No past surgical history on file.  No family history on file.     Review of Systems   Constitutional:  Negative for chills and fatigue.   HENT:  Negative for congestion and sore throat.    Eyes:  Negative for visual disturbance.   Respiratory:  Negative for chest tightness and shortness of breath.    Cardiovascular:  Negative for chest pain and leg swelling.   Gastrointestinal:  Negative for abdominal pain.   Genitourinary:  Positive for vaginal bleeding. Negative for dysuria and vaginal discharge.   Musculoskeletal:  Positive for back pain.   Neurological:  Positive for headaches.       Physical Exam     Initial Vitals [24 1137]   BP Pulse Resp Temp SpO2   (!) 99/58 82 16 97.9 °F (36.6 °C) 99 %      MAP       --         Physical Exam    Vitals reviewed.  Constitutional: She appears well-developed and well-nourished. No distress.   HENT:   Head: Normocephalic and atraumatic.   Neck: Neck supple.   Normal range of  motion.  Cardiovascular:  Normal rate.           Pulmonary/Chest: No respiratory distress.   Abdominal: There is no abdominal tenderness.   Musculoskeletal:      Cervical back: Normal range of motion and neck supple.     Neurological: She is alert and oriented to person, place, and time.   Skin: Skin is warm and dry.   Psychiatric: She has a normal mood and affect. Thought content normal.         ED Course   Procedures  Labs Reviewed - No data to display       Imaging Results    None          Medications - No data to display  Medical Decision Making  Sheron Frankel is a 24 y.o.  PPD#28 s/p  presents complaining of back pain, headache, vaginal bleeding.     Temp:  [97.9 °F (36.6 °C)-98.7 °F (37.1 °C)] 98.7 °F (37.1 °C)  Pulse:  [77-82] 77  Resp:  [16-20] 20  SpO2:  [99 %-100 %] 100 %  BP: ()/(58-61) 113/61  PE: within normal limits      Back pain:   [ ] lidocaine patch given   [ ] Rx sent    Headache:   [ ] seen by anesthesia, recommended supportive care with tylenol/ibuprofen   [ ] rx given for fioricet which helped HA in [facility]    Vaginal Bleeding:   -Patient describes mild vaginal bleeding that has decreased since delivery. Routine postpartum lochia reviewed    Due to duration of headache and back pain, unlikely to be spinal headache. Counseled patient on supportive measures with heat packs, tylenol and ibuprofen.   Return to ED precautions given for unrelenting headache, visual changes, or an increase in vaginal bleeding soaking >1 pad/hour.     Patient stable for discharge at this time.     Risk  Prescription drug management.              Attending Attestation:   Physician Attestation Statement for Resident:  As the supervising MD   Physician Attestation Statement: I have personally seen and examined this patient.   I agree with the above history.  -:   As the supervising MD I agree with the above PE.     As the supervising MD I agree with the above treatment, course, plan, and disposition.   -: I  agree with the above edited resident note. Pt seen and examined, chart and labs reviewed.    Briefly, 23 yo  PPD#24 s/p SPONTANEOUS VAGINAL DELIVERY presenting for headache and back pain as well as vaginal bleeding. Afebrile, VSS. History suspicious for spinal headache, though duration of pain is not consistent with this self-limiting condition. Anesthesia to bedside for evaluation and supportive care recommended. Lidocaine patch to back and fioricet given for headache with improvement in pain. Rx for both sent to pharmacy. Postpartum lochia discussed, exam unremarkable and no concern for delayed PPH.     All questions answered. Stable for d/c home with outpatient follow up     Morelia Sauceda MD  OB Hospitalist  2024     I was personally present during the critical portions of the procedure(s) performed by the resident and was immediately available in the ED to provide services and assistance as needed during the entire procedure.  I have reviewed and agree with the residents interpretation of the following: lab data.  I have reviewed the following: old records at this facility.                                       Clinical Impression:  Final diagnoses:  [M54.9] Back pain, unspecified back location, unspecified back pain laterality, unspecified chronicity (Primary)  [Z39.2] Postpartum state          ED Disposition Condition    Discharge Stable          ED Prescriptions       Medication Sig Dispense Start Date End Date Auth. Provider    butalbital-acetaminophen-caff -40 mg Cap Take 1 capsule by mouth every 4 (four) hours as needed (headache). 30 capsule 2024 Morelia Sauceda MD    LIDOcaine (LIDODERM) 5 % Place 1 patch onto the skin once daily. Remove & Discard patch within 12 hours or as directed by MD for 7 days 7 patch 2024 Morelia Sauceda MD          Follow-up Information    None          Abril Braswell MD  Resident  24       Morelia Sauceda,  MD  09/09/24 2023

## 2024-09-09 NOTE — CARE UPDATE
Called to evaluate pt in [facility] for lower back pain and headache. Narendra  used for encounter. Upon discussion with patient, she states that she had back pain that initially started after vaginal delivery and lasted for about a week. She states that the back pain resolved after the first week, but it has now come back in the last 1-2 days. She describes the pain as stabbing and reports discomfort with palpation to lumbar spine. She denies weakness or loss of sensation in the BL LE. She has not tried any medications or therapies for her back pain or headache at home. Concerning her headache, she states that the onset was about 2 days ago and has been 10/10 since. Difficulty in characterizing quality and exacerbating factors due to language barrier despite having . However, she states that her headache is severe and everything makes it worse. Considering timing and character of patient's symptoms, headache is unlikely to be a spinal headache. As for her back pain, it is possible that there may be a small contributing factor from her epidural; however, it is more likely that her pain is related to her labor process and vaginal delivery. Discussed with OB team. Recommend supportive care with adequate hydration, tylenol, ibuprofen, and lidocaine patches to back.

## 2024-09-18 ENCOUNTER — PATIENT MESSAGE (OUTPATIENT)
Facility: CLINIC | Age: 24
End: 2024-09-18
Payer: MEDICAID

## 2024-09-19 ENCOUNTER — PATIENT MESSAGE (OUTPATIENT)
Facility: CLINIC | Age: 24
End: 2024-09-19
Payer: MEDICAID

## 2024-09-20 ENCOUNTER — PATIENT MESSAGE (OUTPATIENT)
Facility: CLINIC | Age: 24
End: 2024-09-20

## 2024-09-26 PROBLEM — Z34.80 SUPERVISION OF OTHER NORMAL PREGNANCY, ANTEPARTUM: Status: RESOLVED | Noted: 2024-05-28 | Resolved: 2024-09-26
